# Patient Record
Sex: FEMALE | Employment: OTHER | URBAN - NONMETROPOLITAN AREA
[De-identification: names, ages, dates, MRNs, and addresses within clinical notes are randomized per-mention and may not be internally consistent; named-entity substitution may affect disease eponyms.]

---

## 2021-04-13 ENCOUNTER — OFFICE VISIT (OUTPATIENT)
Dept: PODIATRY | Age: 81
End: 2021-04-13
Payer: COMMERCIAL

## 2021-04-13 VITALS — RESPIRATION RATE: 20 BRPM

## 2021-04-13 DIAGNOSIS — I87.2 CHRONIC VENOUS INSUFFICIENCY: ICD-10-CM

## 2021-04-13 DIAGNOSIS — B35.1 DERMATOPHYTOSIS OF NAIL: ICD-10-CM

## 2021-04-13 DIAGNOSIS — M20.42 HAMMER TOES OF BOTH FEET: ICD-10-CM

## 2021-04-13 DIAGNOSIS — E11.51 CONTROLLED TYPE 2 DM WITH PERIPHERAL CIRCULATORY DISORDER (HCC): Primary | ICD-10-CM

## 2021-04-13 DIAGNOSIS — M20.41 HAMMER TOES OF BOTH FEET: ICD-10-CM

## 2021-04-13 PROCEDURE — 11720 DEBRIDE NAIL 1-5: CPT | Performed by: PODIATRIST

## 2021-04-13 PROCEDURE — 99203 OFFICE O/P NEW LOW 30 MIN: CPT | Performed by: PODIATRIST

## 2021-04-13 RX ORDER — ISOSORBIDE MONONITRATE 30 MG/1
30 TABLET, EXTENDED RELEASE ORAL 2 TIMES DAILY
COMMUNITY

## 2021-04-13 RX ORDER — GLIMEPIRIDE 4 MG/1
TABLET ORAL
COMMUNITY
Start: 2021-02-05

## 2021-04-13 RX ORDER — ALLOPURINOL 100 MG/1
TABLET ORAL
COMMUNITY
Start: 2021-03-24

## 2021-04-13 RX ORDER — EZETIMIBE 10 MG/1
10 TABLET ORAL DAILY
COMMUNITY
Start: 2020-05-06 | End: 2021-05-06

## 2021-04-13 RX ORDER — PRASUGREL 10 MG/1
5 TABLET, FILM COATED ORAL DAILY
COMMUNITY
Start: 2020-12-05

## 2021-04-13 RX ORDER — ASPIRIN 81 MG/1
81 TABLET ORAL DAILY
COMMUNITY

## 2021-04-13 RX ORDER — CALCIUM CARBONATE 300MG(750)
400 TABLET,CHEWABLE ORAL DAILY
COMMUNITY

## 2021-04-13 RX ORDER — VITAMIN E 268 MG
400 CAPSULE ORAL DAILY
COMMUNITY

## 2021-04-13 SDOH — HEALTH STABILITY: MENTAL HEALTH: HOW OFTEN DO YOU HAVE A DRINK CONTAINING ALCOHOL?: NEVER

## 2021-04-13 SDOH — HEALTH STABILITY: MENTAL HEALTH: HOW MANY STANDARD DRINKS CONTAINING ALCOHOL DO YOU HAVE ON A TYPICAL DAY?: NOT ASKED

## 2021-04-13 NOTE — PROGRESS NOTES
Subjective:  Valentino Rincon is a 78 y.o. female who presents to the office today complaining of swelling in leg.s symptoms began  year(s) ago. Patient relates pain is Absent . Pain is rated 0 out of 10 and is described as none. Treatments prior to today's visit include: none. Patient also request foot care. Currently denies F/C/N/V. Allergies   Allergen Reactions    Ticagrelor Shortness Of Breath    Indomethacin Other (See Comments)     flushing    Other      Other reaction(s): muscle cramps, Muscle Pain  - all of the statins      Pioglitazone Other (See Comments)     Can't remember reaction  Flushing feeling         Past Medical History:   Diagnosis Date    Arthritis     Diabetes ([facility] [address]     Hypertension        Prior to Admission medications    Medication Sig Start Date End Date Taking?  Authorizing Provider   Magnesium 400 MG TABS Take 400 mg by mouth daily   Yes Historical Provider, MD   Cyanocobalamin (B-12 SL) Place 1,000 tablets under the tongue daily   Yes Historical Provider, MD   Cholecalciferol 50 MCG (2000 UT) CAPS Take by mouth daily   Yes Historical Provider, MD   vitamin E 400 UNIT capsule Take 400 Units by mouth daily   Yes Historical Provider, MD   metFORMIN (GLUCOPHAGE) 500 MG tablet  3/5/21  Yes Historical Provider, MD   glimepiride (AMARYL) 4 MG tablet  2/5/21  Yes Historical Provider, MD   isosorbide mononitrate (IMDUR) 30 MG extended release tablet Take 30 mg by mouth 2 times daily   Yes Historical Provider, MD   prasugrel (EFFIENT) 10 MG TABS Take 5 mg by mouth daily 12/5/20  Yes Historical Provider, MD   allopurinol (ZYLOPRIM) 100 MG tablet  3/24/21  Yes Historical Provider, MD   ezetimibe (ZETIA) 10 MG tablet Take 10 mg by mouth daily 5/6/20 5/6/21 Yes Historical Provider, MD   metoprolol tartrate (LOPRESSOR) 25 MG tablet TAKE 1 TABLET TWICE A DAY 12/17/20 12/17/21 Yes Historical Provider, MD   aspirin 81 MG EC tablet Take 81 mg by mouth daily   Yes Historical Provider, MD Ascorbic Acid (VITAMIN C PO) Take by mouth   Yes Historical Provider, MD   CO ENZYME Q-10 PO Take by mouth   Yes Historical Provider, MD       Past Surgical History:   Procedure Laterality Date    COLONOSCOPY      FEMUR SURGERY Right     [facility]      TONSILLECTOMY AND ADENOIDECTOMY         History reviewed. No pertinent family history. Social History     Tobacco Use    Smoking status: Never Smoker    Smokeless tobacco: Never Used   Substance Use Topics    Alcohol use: Never     Frequency: Never     Binge frequency: Never       ROS: All 14 ROS systems reviewed and pertinent positives noted above, all others negative. Lower Extremity Physical Examination:     Vitals:   Vitals:    04/13/21 1334   Resp: 20     General: AAO x 3 in NAD. Vascular: DP and PT pulses palpable 2/4, bilateral.  CFT <3 seconds, bilateral.  Hair growth absent to the level of the digits, bilateral.  Edema present, bilateral.  Varicosities present, bilateral. Erythema absent, bilateral. Distal Rubor absent bilateral.  Temperature decreased bilateral. Hyperpigmentation present bilateral. Atrophic skin yes. Neurological: Sensation intact to light touch to level of digits, bilateral.  Protective sensation intact 10/10 sites via 5.07/10g [place] Monofilament, bilateral.  negative Tinel's, bilateral.  negative Valleix sign, bilateral.  Vibratory intact bilateral.  Reflexes Decreased bilateral.  Paresthesias negative. Dysthesias negative. Sharp/dull intact bilateral.    Musculoskeletal: Muscle strength 5/5, bilateral.  Pain absent upon palpation bilateral. Normal medial longitudinal arch, bilateral.  Ankle ROM decreased,bilateral.  1st MPJ ROM within normal limits, bilateral.  Dorsally contracted digits absent. Pronation. No other foot deformities.     Integument: Warm, dry, supple, bilateral.  Open lesion absent, bilateral.  Interdigital maceration absent to web spaces bilateral. Nails left 1, 5 and right 1, 5 thickened, dystrophic and crumbly, discolored with subungual debris. Fissures absent, bilateral. Hyperkeratotic tissue is absent. Asessment: Patient is a 78 y.o. female with:    Diagnosis Orders   1. Controlled type 2 DM with peripheral circulatory disorder ([address]     2. Dermatophytosis of nail     3. Hammer toes of both feet     4. Chronic venous insufficiency         Plan: Patient examined and evaluated. Current condition and treatment options discussed in detail. Patient should use compression stockings on a regular basis. Any increase in swelling or redness or signs of ulceration will be seen back for further compression wrap therapy. Also advised importance of continued elevation of the leg. DM foot ed and exam  All nails as mentioned above debrided in length and thickness. Patient advised OTC methods for treatment of the mycotic nails. Patient will follow up in 10 weeks. Patient low level medical decision making this visit. Patient is 1 acute on copy condition on stable chronic condition. No new testing ordered. Overall low risk of morbidity with current treatment course. Contact office with any questions/problems/concerns.

## 2021-04-13 NOTE — PROGRESS NOTES
Foot Care Worksheet  PCP: Ledon Plants  Last visit: 03 / 04 / 2021    Nail description:  Thick , Yellow , Crumbly , Marked limitation of ambulation     Pain resulting from thickened and dystrophy of nail plate No    Nails involved  Right   1, 5  (T5-T9)  Left     1, 5  (TA-T4)    Q7 1 Class A Finding - Non traumatic amputation of foot No    Q8 2 Class B Findings - Absent DP pulse No, Absent PT pulse No, Advanced tropic changes (3 required) Yes    Decrease hair growth Yes, Nail changes/thickening Yes, Pigmented changes/discoloration Yes, Skin texture (thin, shiny) Yes and No, Skin color (rubor/redness) No    Q9 1 Class B and 2 Class C Findings  Claudication No, Temperature change Yes, Paresthesia No, Burning No, Edema Yes

## 2021-08-16 ENCOUNTER — INITIAL CONSULT (OUTPATIENT)
Dept: SURGERY | Age: 81
End: 2021-08-16
Payer: COMMERCIAL

## 2021-08-16 VITALS
WEIGHT: 276 LBS | DIASTOLIC BLOOD PRESSURE: 72 MMHG | OXYGEN SATURATION: 93 % | SYSTOLIC BLOOD PRESSURE: 130 MMHG | TEMPERATURE: 97.6 F | HEART RATE: 82 BPM | BODY MASS INDEX: 50.79 KG/M2 | HEIGHT: 62 IN

## 2021-08-16 DIAGNOSIS — E66.01 MORBID OBESITY WITH BMI OF 50.0-59.9, ADULT (HCC): ICD-10-CM

## 2021-08-16 DIAGNOSIS — E11.42 DIABETIC POLYNEUROPATHY ASSOCIATED WITH TYPE 2 DIABETES MELLITUS (HCC): ICD-10-CM

## 2021-08-16 DIAGNOSIS — I73.9 ARTERIAL INSUFFICIENCY OF LOWER EXTREMITY (HCC): ICD-10-CM

## 2021-08-16 DIAGNOSIS — L81.9 DISCOLORATION OF SKIN OF TOE: Primary | ICD-10-CM

## 2021-08-16 PROCEDURE — 99202 OFFICE O/P NEW SF 15 MIN: CPT | Performed by: SURGERY

## 2021-08-16 RX ORDER — NITROGLYCERIN 0.4 MG/1
1 TABLET SUBLINGUAL EVERY 5 MIN PRN
COMMUNITY

## 2021-08-16 RX ORDER — LIRAGLUTIDE 6 MG/ML
INJECTION SUBCUTANEOUS
COMMUNITY
Start: 2021-06-07

## 2021-08-16 RX ORDER — FUROSEMIDE 20 MG/1
10 TABLET ORAL DAILY
COMMUNITY

## 2021-08-16 RX ORDER — ACETAMINOPHEN,DIPHENHYDRAMINE HCL 500; 25 MG/1; MG/1
1 TABLET, FILM COATED ORAL NIGHTLY PRN
COMMUNITY

## 2025-04-03 ENCOUNTER — HOSPITAL ENCOUNTER (INPATIENT)
Age: 85
LOS: 5 days | Discharge: HOME | DRG: 330 | End: 2025-04-08
Attending: INTERNAL MEDICINE | Admitting: INTERNAL MEDICINE
Payer: MEDICARE

## 2025-04-03 ENCOUNTER — ANESTHESIA (OUTPATIENT)
Dept: OPERATING ROOM | Age: 85
End: 2025-04-03
Payer: MEDICARE

## 2025-04-03 ENCOUNTER — ANESTHESIA EVENT (OUTPATIENT)
Dept: OPERATING ROOM | Age: 85
End: 2025-04-03
Payer: MEDICARE

## 2025-04-03 DIAGNOSIS — K56.609 SMALL BOWEL OBSTRUCTION (HCC): Primary | ICD-10-CM

## 2025-04-03 PROBLEM — Z95.5 S/P CORONARY ARTERY STENT PLACEMENT: Status: RESOLVED | Noted: 2022-09-29 | Resolved: 2025-04-03

## 2025-04-03 PROBLEM — H43.813 POSTERIOR VITREOUS DETACHMENT OF BOTH EYES: Status: RESOLVED | Noted: 2021-12-13 | Resolved: 2025-04-03

## 2025-04-03 PROBLEM — H40.053 OCULAR HYPERTENSION, BILATERAL: Status: RESOLVED | Noted: 2022-12-12 | Resolved: 2025-04-03

## 2025-04-03 PROBLEM — Z92.89 HISTORY OF CARDIOVERSION: Status: RESOLVED | Noted: 2024-05-10 | Resolved: 2025-04-03

## 2025-04-03 PROBLEM — S82.91XD CLOSED FRACTURE OF RIGHT LOWER EXTREMITY WITH ROUTINE HEALING: Status: RESOLVED | Noted: 2017-05-16 | Resolved: 2025-04-03

## 2025-04-03 PROBLEM — E11.3291 MILD NONPROLIFERATIVE DIABETIC RETINOPATHY OF RIGHT EYE WITHOUT MACULAR EDEMA ASSOCIATED WITH TYPE 2 DIABETES MELLITUS: Status: ACTIVE | Noted: 2021-12-13

## 2025-04-03 PROBLEM — Z80.8 FAMILY HISTORY OF MELANOMA: Status: RESOLVED | Noted: 2017-05-31 | Resolved: 2025-04-03

## 2025-04-03 PROBLEM — H04.129 DRY EYE: Status: RESOLVED | Noted: 2021-12-13 | Resolved: 2025-04-03

## 2025-04-03 PROBLEM — T46.6X5A MYALGIA DUE TO STATIN: Status: ACTIVE | Noted: 2018-12-02

## 2025-04-03 PROBLEM — E11.42 DIABETIC POLYNEUROPATHY ASSOCIATED WITH TYPE 2 DIABETES MELLITUS: Status: ACTIVE | Noted: 2017-05-31

## 2025-04-03 PROBLEM — R07.9 CHEST PAIN: Status: RESOLVED | Noted: 2022-09-28 | Resolved: 2025-04-03

## 2025-04-03 PROBLEM — M79.10 MYALGIA DUE TO STATIN: Status: ACTIVE | Noted: 2018-12-02

## 2025-04-03 PROBLEM — H40.003 GLAUCOMA SUSPECT OF BOTH EYES: Status: RESOLVED | Noted: 2019-05-13 | Resolved: 2025-04-03

## 2025-04-03 PROBLEM — H91.93 BILATERAL HEARING LOSS: Status: ACTIVE | Noted: 2021-08-30

## 2025-04-03 PROBLEM — I50.32 CHRONIC HEART FAILURE WITH PRESERVED EJECTION FRACTION (HCC): Status: RESOLVED | Noted: 2024-05-13 | Resolved: 2025-04-03

## 2025-04-03 PROBLEM — S09.90XA INJURY OF HEAD: Status: RESOLVED | Noted: 2024-08-29 | Resolved: 2025-04-03

## 2025-04-03 PROBLEM — I50.1 HEART FAILURE, LEFT, WITH LVEF >40% (HCC): Status: ACTIVE | Noted: 2022-09-29

## 2025-04-03 PROBLEM — N18.31 CKD STAGE 3A, GFR 45-59 ML/MIN (HCC): Status: RESOLVED | Noted: 2024-05-13 | Resolved: 2025-04-03

## 2025-04-03 PROBLEM — H02.055 TRICHIASIS OF LEFT LOWER EYELID: Status: RESOLVED | Noted: 2022-12-12 | Resolved: 2025-04-03

## 2025-04-03 PROBLEM — I89.0 LYMPHEDEMA OF BOTH LOWER EXTREMITIES: Status: ACTIVE | Noted: 2021-01-20

## 2025-04-03 PROBLEM — R26.9 GAIT DIFFICULTY: Status: RESOLVED | Noted: 2017-05-16 | Resolved: 2025-04-03

## 2025-04-03 PROBLEM — Z98.890 H/O COLONOSCOPY: Status: RESOLVED | Noted: 2017-05-31 | Resolved: 2025-04-03

## 2025-04-03 PROBLEM — L21.9 SEBORRHEIC DERMATITIS: Status: RESOLVED | Noted: 2017-08-23 | Resolved: 2025-04-03

## 2025-04-03 PROBLEM — D50.9 IDA (IRON DEFICIENCY ANEMIA): Status: ACTIVE | Noted: 2024-05-13

## 2025-04-03 PROBLEM — I48.3 TYPICAL ATRIAL FLUTTER (HCC): Status: ACTIVE | Noted: 2024-05-04

## 2025-04-03 PROBLEM — H35.3131 EARLY DRY STAGE NONEXUDATIVE AGE-RELATED MACULAR DEGENERATION OF BOTH EYES: Status: RESOLVED | Noted: 2019-05-13 | Resolved: 2025-04-03

## 2025-04-03 LAB
ABO + RH BLD: NORMAL
ANION GAP SERPL CALCULATED.3IONS-SCNC: 10 MMOL/L (ref 9–16)
ARM BAND NUMBER: NORMAL
BLOOD BANK SAMPLE EXPIRATION: NORMAL
BLOOD GROUP ANTIBODIES SERPL: NEGATIVE
BUN SERPL-MCNC: 30 MG/DL (ref 8–23)
BUN/CREAT SERPL: 33 (ref 9–20)
CALCIUM SERPL-MCNC: 10.7 MG/DL (ref 8.6–10.4)
CHLORIDE SERPL-SCNC: 107 MMOL/L (ref 98–107)
CO2 SERPL-SCNC: 23 MMOL/L (ref 20–31)
CREAT SERPL-MCNC: 0.9 MG/DL (ref 0.6–0.9)
ERYTHROCYTE [DISTWIDTH] IN BLOOD BY AUTOMATED COUNT: 14.6 % (ref 11.8–14.4)
GFR, ESTIMATED: 62 ML/MIN/1.73M2
GLUCOSE BLD-MCNC: 227 MG/DL (ref 65–105)
GLUCOSE SERPL-MCNC: 184 MG/DL (ref 74–99)
HCT VFR BLD AUTO: 41.2 % (ref 36.3–47.1)
HGB BLD-MCNC: 13.4 G/DL (ref 11.9–15.1)
MCH RBC QN AUTO: 29.8 PG (ref 25.2–33.5)
MCHC RBC AUTO-ENTMCNC: 32.5 G/DL (ref 25.2–33.5)
MCV RBC AUTO: 91.8 FL (ref 82.6–102.9)
NRBC BLD-RTO: 0 PER 100 WBC
PLATELET # BLD AUTO: 233 K/UL (ref 138–453)
PMV BLD AUTO: 10.4 FL (ref 8.1–13.5)
POTASSIUM SERPL-SCNC: 4.1 MMOL/L (ref 3.7–5.3)
RBC # BLD AUTO: 4.49 M/UL (ref 3.95–5.11)
SODIUM SERPL-SCNC: 140 MMOL/L (ref 136–145)
WBC OTHER # BLD: 11.5 K/UL (ref 3.5–11.3)

## 2025-04-03 PROCEDURE — 3700000000 HC ANESTHESIA ATTENDED CARE: Performed by: SURGERY

## 2025-04-03 PROCEDURE — 7100000001 HC PACU RECOVERY - ADDTL 15 MIN: Performed by: SURGERY

## 2025-04-03 PROCEDURE — 82947 ASSAY GLUCOSE BLOOD QUANT: CPT

## 2025-04-03 PROCEDURE — 3700000001 HC ADD 15 MINUTES (ANESTHESIA): Performed by: SURGERY

## 2025-04-03 PROCEDURE — 2709999900 HC NON-CHARGEABLE SUPPLY: Performed by: SURGERY

## 2025-04-03 PROCEDURE — 36415 COLL VENOUS BLD VENIPUNCTURE: CPT

## 2025-04-03 PROCEDURE — 85027 COMPLETE CBC AUTOMATED: CPT

## 2025-04-03 PROCEDURE — 86900 BLOOD TYPING SEROLOGIC ABO: CPT

## 2025-04-03 PROCEDURE — 3600000019 HC SURGERY ROBOT ADDTL 15MIN: Performed by: SURGERY

## 2025-04-03 PROCEDURE — S2900 ROBOTIC SURGICAL SYSTEM: HCPCS | Performed by: SURGERY

## 2025-04-03 PROCEDURE — 2580000003 HC RX 258: Performed by: SURGERY

## 2025-04-03 PROCEDURE — 0DQ80ZZ REPAIR SMALL INTESTINE, OPEN APPROACH: ICD-10-PCS | Performed by: SURGERY

## 2025-04-03 PROCEDURE — 2060000000 HC ICU INTERMEDIATE R&B

## 2025-04-03 PROCEDURE — 2720000010 HC SURG SUPPLY STERILE: Performed by: SURGERY

## 2025-04-03 PROCEDURE — 6360000002 HC RX W HCPCS: Performed by: SURGERY

## 2025-04-03 PROCEDURE — 8E0W4CZ ROBOTIC ASSISTED PROCEDURE OF TRUNK REGION, PERCUTANEOUS ENDOSCOPIC APPROACH: ICD-10-PCS | Performed by: SURGERY

## 2025-04-03 PROCEDURE — 86901 BLOOD TYPING SEROLOGIC RH(D): CPT

## 2025-04-03 PROCEDURE — 64488 TAP BLOCK BI INJECTION: CPT

## 2025-04-03 PROCEDURE — P9047 ALBUMIN (HUMAN), 25%, 50ML: HCPCS

## 2025-04-03 PROCEDURE — 4A1BXSH MONITORING OF GASTROINTESTINAL VASCULAR PERFUSION USING INDOCYANINE GREEN DYE, EXTERNAL APPROACH: ICD-10-PCS | Performed by: SURGERY

## 2025-04-03 PROCEDURE — 0DN80ZZ RELEASE SMALL INTESTINE, OPEN APPROACH: ICD-10-PCS | Performed by: SURGERY

## 2025-04-03 PROCEDURE — 7100000000 HC PACU RECOVERY - FIRST 15 MIN: Performed by: SURGERY

## 2025-04-03 PROCEDURE — 94640 AIRWAY INHALATION TREATMENT: CPT

## 2025-04-03 PROCEDURE — 86850 RBC ANTIBODY SCREEN: CPT

## 2025-04-03 PROCEDURE — 0DNL0ZZ RELEASE TRANSVERSE COLON, OPEN APPROACH: ICD-10-PCS | Performed by: SURGERY

## 2025-04-03 PROCEDURE — 99222 1ST HOSP IP/OBS MODERATE 55: CPT | Performed by: INTERNAL MEDICINE

## 2025-04-03 PROCEDURE — 6360000002 HC RX W HCPCS

## 2025-04-03 PROCEDURE — 0DNU0ZZ RELEASE OMENTUM, OPEN APPROACH: ICD-10-PCS | Performed by: SURGERY

## 2025-04-03 PROCEDURE — 3E0T3BZ INTRODUCTION OF ANESTHETIC AGENT INTO PERIPHERAL NERVES AND PLEXI, PERCUTANEOUS APPROACH: ICD-10-PCS | Performed by: SURGERY

## 2025-04-03 PROCEDURE — 2580000003 HC RX 258

## 2025-04-03 PROCEDURE — 44005 FREEING OF BOWEL ADHESION: CPT | Performed by: SURGERY

## 2025-04-03 PROCEDURE — 2500000003 HC RX 250 WO HCPCS

## 2025-04-03 PROCEDURE — 80048 BASIC METABOLIC PNL TOTAL CA: CPT

## 2025-04-03 PROCEDURE — 6370000000 HC RX 637 (ALT 250 FOR IP): Performed by: INTERNAL MEDICINE

## 2025-04-03 PROCEDURE — 93005 ELECTROCARDIOGRAM TRACING: CPT | Performed by: INTERNAL MEDICINE

## 2025-04-03 PROCEDURE — 99223 1ST HOSP IP/OBS HIGH 75: CPT | Performed by: SURGERY

## 2025-04-03 PROCEDURE — 3600000009 HC SURGERY ROBOT BASE: Performed by: SURGERY

## 2025-04-03 RX ORDER — SODIUM CHLORIDE 0.9 % (FLUSH) 0.9 %
5-40 SYRINGE (ML) INJECTION PRN
Status: DISCONTINUED | OUTPATIENT
Start: 2025-04-03 | End: 2025-04-08 | Stop reason: HOSPADM

## 2025-04-03 RX ORDER — SODIUM CHLORIDE 9 MG/ML
INJECTION, SOLUTION INTRAVENOUS PRN
Status: DISCONTINUED | OUTPATIENT
Start: 2025-04-03 | End: 2025-04-03 | Stop reason: HOSPADM

## 2025-04-03 RX ORDER — SODIUM CHLORIDE, SODIUM LACTATE, POTASSIUM CHLORIDE, CALCIUM CHLORIDE 600; 310; 30; 20 MG/100ML; MG/100ML; MG/100ML; MG/100ML
INJECTION, SOLUTION INTRAVENOUS CONTINUOUS
Status: DISCONTINUED | OUTPATIENT
Start: 2025-04-03 | End: 2025-04-03 | Stop reason: HOSPADM

## 2025-04-03 RX ORDER — GLUCAGON 1 MG/ML
1 KIT INJECTION PRN
Status: DISCONTINUED | OUTPATIENT
Start: 2025-04-03 | End: 2025-04-03 | Stop reason: HOSPADM

## 2025-04-03 RX ORDER — ROCURONIUM BROMIDE 10 MG/ML
INJECTION, SOLUTION INTRAVENOUS
Status: DISCONTINUED | OUTPATIENT
Start: 2025-04-03 | End: 2025-04-03 | Stop reason: SDUPTHER

## 2025-04-03 RX ORDER — METOPROLOL TARTRATE 25 MG/1
25 TABLET, FILM COATED ORAL 2 TIMES DAILY
Status: DISCONTINUED | OUTPATIENT
Start: 2025-04-04 | End: 2025-04-08 | Stop reason: HOSPADM

## 2025-04-03 RX ORDER — NYSTATIN 100000 [USP'U]/G
1 POWDER TOPICAL 3 TIMES DAILY PRN
COMMUNITY

## 2025-04-03 RX ORDER — ACETAMINOPHEN 325 MG/1
650 TABLET ORAL EVERY 4 HOURS PRN
Status: DISCONTINUED | OUTPATIENT
Start: 2025-04-03 | End: 2025-04-05

## 2025-04-03 RX ORDER — SODIUM CHLORIDE 9 MG/ML
INJECTION, SOLUTION INTRAVENOUS PRN
Status: DISCONTINUED | OUTPATIENT
Start: 2025-04-03 | End: 2025-04-08 | Stop reason: HOSPADM

## 2025-04-03 RX ORDER — ONDANSETRON 2 MG/ML
INJECTION INTRAMUSCULAR; INTRAVENOUS
Status: DISCONTINUED | OUTPATIENT
Start: 2025-04-03 | End: 2025-04-03 | Stop reason: SDUPTHER

## 2025-04-03 RX ORDER — ETOMIDATE 2 MG/ML
INJECTION INTRAVENOUS
Status: DISCONTINUED | OUTPATIENT
Start: 2025-04-03 | End: 2025-04-03 | Stop reason: SDUPTHER

## 2025-04-03 RX ORDER — PROPOFOL 10 MG/ML
INJECTION, EMULSION INTRAVENOUS
Status: DISCONTINUED | OUTPATIENT
Start: 2025-04-03 | End: 2025-04-03 | Stop reason: SDUPTHER

## 2025-04-03 RX ORDER — FENTANYL CITRATE 50 UG/ML
INJECTION, SOLUTION INTRAMUSCULAR; INTRAVENOUS
Status: DISCONTINUED | OUTPATIENT
Start: 2025-04-03 | End: 2025-04-03 | Stop reason: SDUPTHER

## 2025-04-03 RX ORDER — CALCIUM CHLORIDE 100 MG/ML
INJECTION INTRAVENOUS; INTRAVENTRICULAR
Status: DISCONTINUED | OUTPATIENT
Start: 2025-04-03 | End: 2025-04-03 | Stop reason: SDUPTHER

## 2025-04-03 RX ORDER — SODIUM CHLORIDE 0.9 % (FLUSH) 0.9 %
10 SYRINGE (ML) INJECTION PRN
Status: DISCONTINUED | OUTPATIENT
Start: 2025-04-03 | End: 2025-04-08 | Stop reason: HOSPADM

## 2025-04-03 RX ORDER — INSULIN LISPRO 100 [IU]/ML
0-4 INJECTION, SOLUTION INTRAVENOUS; SUBCUTANEOUS EVERY 6 HOURS SCHEDULED
Status: DISCONTINUED | OUTPATIENT
Start: 2025-04-04 | End: 2025-04-04

## 2025-04-03 RX ORDER — VASOPRESSIN 20 [USP'U]/ML
INJECTION, SOLUTION INTRAMUSCULAR; SUBCUTANEOUS
Status: DISCONTINUED | OUTPATIENT
Start: 2025-04-03 | End: 2025-04-03 | Stop reason: SDUPTHER

## 2025-04-03 RX ORDER — SODIUM CHLORIDE 0.9 % (FLUSH) 0.9 %
10 SYRINGE (ML) INJECTION EVERY 12 HOURS SCHEDULED
Status: DISCONTINUED | OUTPATIENT
Start: 2025-04-03 | End: 2025-04-08 | Stop reason: HOSPADM

## 2025-04-03 RX ORDER — KETAMINE HYDROCHLORIDE 50 MG/ML
INJECTION, SOLUTION INTRAMUSCULAR; INTRAVENOUS
Status: DISCONTINUED | OUTPATIENT
Start: 2025-04-03 | End: 2025-04-03 | Stop reason: SDUPTHER

## 2025-04-03 RX ORDER — SODIUM CHLORIDE 0.9 % (FLUSH) 0.9 %
5-40 SYRINGE (ML) INJECTION EVERY 12 HOURS SCHEDULED
Status: DISCONTINUED | OUTPATIENT
Start: 2025-04-03 | End: 2025-04-03 | Stop reason: HOSPADM

## 2025-04-03 RX ORDER — GLUCAGON 1 MG/ML
1 KIT INJECTION PRN
Status: DISCONTINUED | OUTPATIENT
Start: 2025-04-03 | End: 2025-04-08 | Stop reason: HOSPADM

## 2025-04-03 RX ORDER — ONDANSETRON 2 MG/ML
4 INJECTION INTRAMUSCULAR; INTRAVENOUS EVERY 6 HOURS PRN
Status: DISCONTINUED | OUTPATIENT
Start: 2025-04-03 | End: 2025-04-08 | Stop reason: HOSPADM

## 2025-04-03 RX ORDER — IPRATROPIUM BROMIDE AND ALBUTEROL SULFATE 2.5; .5 MG/3ML; MG/3ML
1 SOLUTION RESPIRATORY (INHALATION)
Status: DISCONTINUED | OUTPATIENT
Start: 2025-04-03 | End: 2025-04-08 | Stop reason: HOSPADM

## 2025-04-03 RX ORDER — ISOSORBIDE MONONITRATE 60 MG/1
60 TABLET, EXTENDED RELEASE ORAL DAILY
Status: DISCONTINUED | OUTPATIENT
Start: 2025-04-04 | End: 2025-04-08 | Stop reason: HOSPADM

## 2025-04-03 RX ORDER — SODIUM CHLORIDE 0.9 % (FLUSH) 0.9 %
5-40 SYRINGE (ML) INJECTION PRN
Status: DISCONTINUED | OUTPATIENT
Start: 2025-04-03 | End: 2025-04-03 | Stop reason: HOSPADM

## 2025-04-03 RX ORDER — SODIUM CHLORIDE 9 MG/ML
INJECTION, SOLUTION INTRAVENOUS
Status: DISCONTINUED | OUTPATIENT
Start: 2025-04-03 | End: 2025-04-03 | Stop reason: SDUPTHER

## 2025-04-03 RX ORDER — SODIUM CHLORIDE 0.9 % (FLUSH) 0.9 %
5-40 SYRINGE (ML) INJECTION EVERY 12 HOURS SCHEDULED
Status: DISCONTINUED | OUTPATIENT
Start: 2025-04-03 | End: 2025-04-08 | Stop reason: HOSPADM

## 2025-04-03 RX ORDER — SODIUM CHLORIDE 9 MG/ML
50 INJECTION, SOLUTION INTRAVENOUS ONCE
Status: DISCONTINUED | OUTPATIENT
Start: 2025-04-03 | End: 2025-04-03 | Stop reason: SDUPTHER

## 2025-04-03 RX ORDER — NALOXONE HYDROCHLORIDE 0.4 MG/ML
INJECTION, SOLUTION INTRAMUSCULAR; INTRAVENOUS; SUBCUTANEOUS PRN
Status: DISCONTINUED | OUTPATIENT
Start: 2025-04-03 | End: 2025-04-03 | Stop reason: HOSPADM

## 2025-04-03 RX ORDER — IPRATROPIUM BROMIDE AND ALBUTEROL SULFATE 2.5; .5 MG/3ML; MG/3ML
1 SOLUTION RESPIRATORY (INHALATION) EVERY 4 HOURS PRN
Status: DISCONTINUED | OUTPATIENT
Start: 2025-04-03 | End: 2025-04-08 | Stop reason: HOSPADM

## 2025-04-03 RX ORDER — ONDANSETRON 2 MG/ML
4 INJECTION INTRAMUSCULAR; INTRAVENOUS
Status: DISCONTINUED | OUTPATIENT
Start: 2025-04-03 | End: 2025-04-03 | Stop reason: HOSPADM

## 2025-04-03 RX ORDER — SODIUM CHLORIDE 9 MG/ML
50 INJECTION, SOLUTION INTRAVENOUS ONCE
Status: DISCONTINUED | OUTPATIENT
Start: 2025-04-03 | End: 2025-04-03

## 2025-04-03 RX ORDER — SPIRONOLACTONE 25 MG/1
25 TABLET ORAL DAILY
Status: ON HOLD | COMMUNITY
Start: 2024-05-13 | End: 2025-04-08 | Stop reason: HOSPADM

## 2025-04-03 RX ORDER — LABETALOL HYDROCHLORIDE 5 MG/ML
5 INJECTION, SOLUTION INTRAVENOUS
Status: DISCONTINUED | OUTPATIENT
Start: 2025-04-03 | End: 2025-04-03 | Stop reason: HOSPADM

## 2025-04-03 RX ORDER — POLYETHYLENE GLYCOL 3350 17 G/17G
17 POWDER, FOR SOLUTION ORAL DAILY PRN
Status: DISCONTINUED | OUTPATIENT
Start: 2025-04-03 | End: 2025-04-08 | Stop reason: HOSPADM

## 2025-04-03 RX ORDER — DEXMEDETOMIDINE HYDROCHLORIDE 100 UG/ML
INJECTION, SOLUTION INTRAVENOUS
Status: DISCONTINUED | OUTPATIENT
Start: 2025-04-03 | End: 2025-04-03 | Stop reason: SDUPTHER

## 2025-04-03 RX ORDER — MORPHINE SULFATE 2 MG/ML
2 INJECTION, SOLUTION INTRAMUSCULAR; INTRAVENOUS
Status: DISCONTINUED | OUTPATIENT
Start: 2025-04-03 | End: 2025-04-08 | Stop reason: HOSPADM

## 2025-04-03 RX ORDER — BUPIVACAINE HYDROCHLORIDE 2.5 MG/ML
INJECTION, SOLUTION INFILTRATION; PERINEURAL
Status: COMPLETED | OUTPATIENT
Start: 2025-04-03 | End: 2025-04-03

## 2025-04-03 RX ORDER — DEXTROSE MONOHYDRATE 100 MG/ML
INJECTION, SOLUTION INTRAVENOUS CONTINUOUS PRN
Status: DISCONTINUED | OUTPATIENT
Start: 2025-04-03 | End: 2025-04-03 | Stop reason: HOSPADM

## 2025-04-03 RX ORDER — COLCHICINE 0.6 MG/1
0.6 TABLET ORAL 2 TIMES DAILY
COMMUNITY

## 2025-04-03 RX ORDER — BUPIVACAINE HYDROCHLORIDE 2.5 MG/ML
INJECTION, SOLUTION EPIDURAL; INFILTRATION; INTRACAUDAL; PERINEURAL
Status: COMPLETED | OUTPATIENT
Start: 2025-04-03 | End: 2025-04-03

## 2025-04-03 RX ORDER — ACETAMINOPHEN 650 MG/1
650 SUPPOSITORY RECTAL EVERY 6 HOURS PRN
Status: DISCONTINUED | OUTPATIENT
Start: 2025-04-03 | End: 2025-04-03 | Stop reason: SDUPTHER

## 2025-04-03 RX ORDER — ALBUMIN (HUMAN) 12.5 G/50ML
SOLUTION INTRAVENOUS
Status: DISCONTINUED | OUTPATIENT
Start: 2025-04-03 | End: 2025-04-03 | Stop reason: SDUPTHER

## 2025-04-03 RX ORDER — LIDOCAINE HYDROCHLORIDE 10 MG/ML
INJECTION, SOLUTION EPIDURAL; INFILTRATION; INTRACAUDAL; PERINEURAL
Status: DISCONTINUED | OUTPATIENT
Start: 2025-04-03 | End: 2025-04-03 | Stop reason: SDUPTHER

## 2025-04-03 RX ORDER — ACETAMINOPHEN 325 MG/1
650 TABLET ORAL EVERY 6 HOURS PRN
Status: DISCONTINUED | OUTPATIENT
Start: 2025-04-03 | End: 2025-04-03 | Stop reason: SDUPTHER

## 2025-04-03 RX ORDER — TIRZEPATIDE 5 MG/.5ML
5 INJECTION, SOLUTION SUBCUTANEOUS WEEKLY
Status: ON HOLD | COMMUNITY
Start: 2024-06-25 | End: 2025-04-08 | Stop reason: HOSPADM

## 2025-04-03 RX ORDER — INDOCYANINE GREEN AND WATER 25 MG
KIT INJECTION
Status: DISCONTINUED | OUTPATIENT
Start: 2025-04-03 | End: 2025-04-03 | Stop reason: SDUPTHER

## 2025-04-03 RX ORDER — DEXTROSE MONOHYDRATE 100 MG/ML
INJECTION, SOLUTION INTRAVENOUS CONTINUOUS PRN
Status: DISCONTINUED | OUTPATIENT
Start: 2025-04-03 | End: 2025-04-03 | Stop reason: SDUPTHER

## 2025-04-03 RX ORDER — MORPHINE SULFATE 4 MG/ML
4 INJECTION, SOLUTION INTRAMUSCULAR; INTRAVENOUS
Status: DISCONTINUED | OUTPATIENT
Start: 2025-04-03 | End: 2025-04-08 | Stop reason: HOSPADM

## 2025-04-03 RX ORDER — SODIUM CHLORIDE 9 MG/ML
INJECTION, SOLUTION INTRAVENOUS CONTINUOUS
Status: DISCONTINUED | OUTPATIENT
Start: 2025-04-03 | End: 2025-04-08

## 2025-04-03 RX ORDER — DEXTROSE MONOHYDRATE 100 MG/ML
INJECTION, SOLUTION INTRAVENOUS CONTINUOUS PRN
Status: DISCONTINUED | OUTPATIENT
Start: 2025-04-03 | End: 2025-04-08 | Stop reason: HOSPADM

## 2025-04-03 RX ORDER — DEXAMETHASONE SODIUM PHOSPHATE 4 MG/ML
INJECTION, SOLUTION INTRA-ARTICULAR; INTRALESIONAL; INTRAMUSCULAR; INTRAVENOUS; SOFT TISSUE
Status: DISCONTINUED | OUTPATIENT
Start: 2025-04-03 | End: 2025-04-03 | Stop reason: SDUPTHER

## 2025-04-03 RX ORDER — INSULIN GLARGINE 100 [IU]/ML
10 INJECTION, SOLUTION SUBCUTANEOUS DAILY
Status: DISCONTINUED | OUTPATIENT
Start: 2025-04-04 | End: 2025-04-04

## 2025-04-03 RX ORDER — MAGNESIUM SULFATE HEPTAHYDRATE 500 MG/ML
INJECTION, SOLUTION INTRAMUSCULAR; INTRAVENOUS
Status: DISCONTINUED | OUTPATIENT
Start: 2025-04-03 | End: 2025-04-03 | Stop reason: SDUPTHER

## 2025-04-03 RX ADMIN — SODIUM CHLORIDE, POTASSIUM CHLORIDE, SODIUM LACTATE AND CALCIUM CHLORIDE: 600; 310; 30; 20 INJECTION, SOLUTION INTRAVENOUS at 15:59

## 2025-04-03 RX ADMIN — VASOPRESSIN 1 UNITS: 20 INJECTION INTRAVENOUS at 17:03

## 2025-04-03 RX ADMIN — Medication 2000 MG: at 16:19

## 2025-04-03 RX ADMIN — ETOMIDATE 20 MG: 2 INJECTION, SOLUTION INTRAVENOUS at 16:06

## 2025-04-03 RX ADMIN — FENTANYL CITRATE 50 MCG: 50 INJECTION, SOLUTION INTRAMUSCULAR; INTRAVENOUS at 16:06

## 2025-04-03 RX ADMIN — CALCIUM CHLORIDE 0.2 G: 100 INJECTION, SOLUTION INTRAVENOUS at 17:01

## 2025-04-03 RX ADMIN — CALCIUM CHLORIDE 0.2 G: 100 INJECTION, SOLUTION INTRAVENOUS at 17:06

## 2025-04-03 RX ADMIN — SODIUM CHLORIDE: 0.9 INJECTION, SOLUTION INTRAVENOUS at 21:16

## 2025-04-03 RX ADMIN — PROPOFOL 20 MG: 10 INJECTION, EMULSION INTRAVENOUS at 16:11

## 2025-04-03 RX ADMIN — PROTHROMBIN COMPLEX CONCENTRATE (HUMAN) 2000 UNITS: 25.5; 16.5; 24; 22; 22; 26 POWDER, FOR SOLUTION INTRAVENOUS at 15:58

## 2025-04-03 RX ADMIN — CALCIUM CHLORIDE 0.2 G: 100 INJECTION, SOLUTION INTRAVENOUS at 16:51

## 2025-04-03 RX ADMIN — SUGAMMADEX 100 MG: 100 INJECTION, SOLUTION INTRAVENOUS at 19:26

## 2025-04-03 RX ADMIN — BUPIVACAINE HYDROCHLORIDE 20 ML: 2.5 INJECTION, SOLUTION INFILTRATION; PERINEURAL at 19:07

## 2025-04-03 RX ADMIN — DEXAMETHASONE SODIUM PHOSPHATE 4 MG: 4 INJECTION INTRA-ARTICULAR; INTRALESIONAL; INTRAMUSCULAR; INTRAVENOUS; SOFT TISSUE at 16:33

## 2025-04-03 RX ADMIN — PHENYLEPHRINE HYDROCHLORIDE 100 MCG: 10 INJECTION INTRAVENOUS at 16:17

## 2025-04-03 RX ADMIN — IPRATROPIUM BROMIDE AND ALBUTEROL SULFATE 1 DOSE: .5; 2.5 SOLUTION RESPIRATORY (INHALATION) at 15:31

## 2025-04-03 RX ADMIN — PROPOFOL 120 MCG/KG/MIN: 10 INJECTION, EMULSION INTRAVENOUS at 16:10

## 2025-04-03 RX ADMIN — SODIUM CHLORIDE: 9 INJECTION, SOLUTION INTRAVENOUS at 16:20

## 2025-04-03 RX ADMIN — Medication 30 MG: at 16:06

## 2025-04-03 RX ADMIN — DEXMEDETOMIDINE HYDROCHLORIDE 5 MCG: 100 INJECTION, SOLUTION INTRAVENOUS at 17:16

## 2025-04-03 RX ADMIN — DEXMEDETOMIDINE HYDROCHLORIDE 5 MCG: 100 INJECTION, SOLUTION INTRAVENOUS at 17:05

## 2025-04-03 RX ADMIN — PHENYLEPHRINE HYDROCHLORIDE 100 MCG: 10 INJECTION INTRAVENOUS at 16:38

## 2025-04-03 RX ADMIN — PHENYLEPHRINE HYDROCHLORIDE 100 MCG: 10 INJECTION INTRAVENOUS at 17:44

## 2025-04-03 RX ADMIN — SODIUM CHLORIDE, PRESERVATIVE FREE 10 ML: 5 INJECTION INTRAVENOUS at 21:17

## 2025-04-03 RX ADMIN — CALCIUM CHLORIDE 0.2 G: 100 INJECTION, SOLUTION INTRAVENOUS at 16:55

## 2025-04-03 RX ADMIN — CALCIUM CHLORIDE 0.2 G: 100 INJECTION, SOLUTION INTRAVENOUS at 16:57

## 2025-04-03 RX ADMIN — HYDROMORPHONE HYDROCHLORIDE 0.25 MG: 1 INJECTION, SOLUTION INTRAMUSCULAR; INTRAVENOUS; SUBCUTANEOUS at 18:52

## 2025-04-03 RX ADMIN — Medication 20 MG: at 16:24

## 2025-04-03 RX ADMIN — BUPIVACAINE HYDROCHLORIDE 20 ML: 2.5 INJECTION, SOLUTION EPIDURAL; INFILTRATION; INTRACAUDAL; PERINEURAL at 19:07

## 2025-04-03 RX ADMIN — ROCURONIUM BROMIDE 20 MG: 10 INJECTION INTRAVENOUS at 17:29

## 2025-04-03 RX ADMIN — DEXMEDETOMIDINE HYDROCHLORIDE 5 MCG: 100 INJECTION, SOLUTION INTRAVENOUS at 16:15

## 2025-04-03 RX ADMIN — ONDANSETRON 4 MG: 2 INJECTION, SOLUTION INTRAMUSCULAR; INTRAVENOUS at 18:51

## 2025-04-03 RX ADMIN — ROCURONIUM BROMIDE 20 MG: 10 INJECTION INTRAVENOUS at 18:29

## 2025-04-03 RX ADMIN — INDOCYANINE GREEN AND WATER 2.5 MG: KIT at 18:22

## 2025-04-03 RX ADMIN — Medication 2000 MG: at 21:35

## 2025-04-03 RX ADMIN — PHENYLEPHRINE HYDROCHLORIDE 100 MCG: 10 INJECTION INTRAVENOUS at 18:47

## 2025-04-03 RX ADMIN — HYDROMORPHONE HYDROCHLORIDE 0.25 MG: 1 INJECTION, SOLUTION INTRAMUSCULAR; INTRAVENOUS; SUBCUTANEOUS at 19:15

## 2025-04-03 RX ADMIN — VASOPRESSIN 0.5 UNITS: 20 INJECTION INTRAVENOUS at 17:57

## 2025-04-03 RX ADMIN — PHENYLEPHRINE HYDROCHLORIDE 100 MCG: 10 INJECTION INTRAVENOUS at 18:06

## 2025-04-03 RX ADMIN — FENTANYL CITRATE 50 MCG: 50 INJECTION, SOLUTION INTRAMUSCULAR; INTRAVENOUS at 17:32

## 2025-04-03 RX ADMIN — LIDOCAINE HYDROCHLORIDE 50 MG: 10 INJECTION, SOLUTION EPIDURAL; INFILTRATION; INTRACAUDAL; PERINEURAL at 16:06

## 2025-04-03 RX ADMIN — PHENYLEPHRINE HYDROCHLORIDE 100 MCG: 10 INJECTION INTRAVENOUS at 17:20

## 2025-04-03 RX ADMIN — HYDROMORPHONE HYDROCHLORIDE 0.25 MG: 1 INJECTION, SOLUTION INTRAMUSCULAR; INTRAVENOUS; SUBCUTANEOUS at 17:52

## 2025-04-03 RX ADMIN — VASOPRESSIN 1 UNITS: 20 INJECTION INTRAVENOUS at 16:17

## 2025-04-03 RX ADMIN — PHENYLEPHRINE HYDROCHLORIDE 100 MCG: 10 INJECTION INTRAVENOUS at 16:53

## 2025-04-03 RX ADMIN — ROCURONIUM BROMIDE 10 MG: 10 INJECTION INTRAVENOUS at 17:51

## 2025-04-03 RX ADMIN — PROPOFOL 20 MG: 10 INJECTION, EMULSION INTRAVENOUS at 16:09

## 2025-04-03 RX ADMIN — SUGAMMADEX 200 MG: 100 INJECTION, SOLUTION INTRAVENOUS at 19:18

## 2025-04-03 RX ADMIN — VASOPRESSIN 1 UNITS: 20 INJECTION INTRAVENOUS at 16:47

## 2025-04-03 RX ADMIN — ALBUMIN (HUMAN) 25 G: 0.25 INJECTION, SOLUTION INTRAVENOUS at 16:57

## 2025-04-03 RX ADMIN — PHENYLEPHRINE HYDROCHLORIDE 100 MCG: 10 INJECTION INTRAVENOUS at 16:20

## 2025-04-03 RX ADMIN — HYDROMORPHONE HYDROCHLORIDE 0.25 MG: 1 INJECTION, SOLUTION INTRAMUSCULAR; INTRAVENOUS; SUBCUTANEOUS at 19:21

## 2025-04-03 RX ADMIN — MAGNESIUM SULFATE HEPTAHYDRATE 1 G: 500 INJECTION, SOLUTION INTRAMUSCULAR; INTRAVENOUS at 16:40

## 2025-04-03 RX ADMIN — VASOPRESSIN 1 UNITS: 20 INJECTION INTRAVENOUS at 16:32

## 2025-04-03 RX ADMIN — DEXMEDETOMIDINE HYDROCHLORIDE 5 MCG: 100 INJECTION, SOLUTION INTRAVENOUS at 16:11

## 2025-04-03 RX ADMIN — ROCURONIUM BROMIDE 100 MG: 10 INJECTION INTRAVENOUS at 16:06

## 2025-04-03 RX ADMIN — VASOPRESSIN 0.5 UNITS: 20 INJECTION INTRAVENOUS at 18:26

## 2025-04-03 RX ADMIN — PHENYLEPHRINE HYDROCHLORIDE 100 MCG: 10 INJECTION INTRAVENOUS at 18:17

## 2025-04-03 ASSESSMENT — PAIN DESCRIPTION - FREQUENCY: FREQUENCY: CONTINUOUS

## 2025-04-03 ASSESSMENT — PAIN DESCRIPTION - DESCRIPTORS: DESCRIPTORS: STABBING

## 2025-04-03 ASSESSMENT — PAIN DESCRIPTION - LOCATION: LOCATION: ABDOMEN

## 2025-04-03 ASSESSMENT — PAIN SCALES - GENERAL
PAINLEVEL_OUTOF10: 0
PAINLEVEL_OUTOF10: 8

## 2025-04-03 ASSESSMENT — PAIN DESCRIPTION - ONSET: ONSET: GRADUAL

## 2025-04-03 ASSESSMENT — LIFESTYLE VARIABLES: HOW OFTEN DO YOU HAVE A DRINK CONTAINING ALCOHOL: NEVER

## 2025-04-03 ASSESSMENT — PAIN DESCRIPTION - PAIN TYPE: TYPE: ACUTE PAIN

## 2025-04-03 ASSESSMENT — PAIN SCALES - WONG BAKER: WONGBAKER_NUMERICALRESPONSE: NO HURT

## 2025-04-03 ASSESSMENT — PAIN DESCRIPTION - ORIENTATION: ORIENTATION: UPPER

## 2025-04-03 NOTE — ANESTHESIA PRE PROCEDURE
Department of Anesthesiology  Preprocedure Note       Name:  Urbano Levy   Age:  84 y.o.  :  1940                                          MRN:  [id number]         Date:  4/3/2025      Surgeon: Surgeon(s):  Izaiah Crenshaw DO    Procedure: Procedure(s):  Laparoscopic robotic assisted exploration of abdomen possible open    Medications prior to admission:   Prior to Admission medications    Medication Sig Start Date End Date Taking? Authorizing Provider   diclofenac sodium (VOLTAREN) 1 % GEL Apply 2 g topically 4 times daily    Daniel Rhodes MD   diphenhydrAMINE-APAP, sleep, (TYLENOL PM EXTRA STRENGTH)  MG tablet Take 1 tablet by mouth nightly as needed    Daniel Rhodes MD   furosemide (LASIX) 20 MG tablet Take 10 mg by mouth daily    Daniel Rhodes MD   VICTOZA 18 MG/3ML SOPN SC injection  21   Daniel Rhodes MD   nitroGLYCERIN (NITROSTAT) 0.4 MG SL tablet Place 1 tablet under the tongue every 5 minutes as needed    Daniel Rhodes MD   Magnesium 400 MG TABS Take 400 mg by mouth daily    Daniel Rhodes MD   Cyanocobalamin (B-12 SL) Place 1,000 tablets under the tongue daily    Daniel Rhodes MD   Cholecalciferol 50 MCG ( UT) CAPS Take by mouth daily    Daniel Rhodes MD   vitamin E 400 UNIT capsule Take 400 Units by mouth daily    Daniel Rhodes MD   metFORMIN (GLUCOPHAGE) 500 MG tablet  3/5/21   Daniel Rhodes MD   glimepiride (AMARYL) 4 MG tablet  21   Daniel Rhodes MD   isosorbide mononitrate (IMDUR) 30 MG extended release tablet Take 30 mg by mouth 2 times daily    Daniel Rhodes MD   prasugrel (EFFIENT) 10 MG TABS Take 5 mg by mouth daily 20   Daniel Rhodes MD   allopurinol (ZYLOPRIM) 100 MG tablet  3/24/21   Daniel Rhodes MD   ezetimibe (ZETIA) 10 MG tablet Take 10 mg by mouth daily 20  Daniel Rhodes MD   metoprolol tartrate (LOPRESSOR) 25 MG tablet TAKE 1

## 2025-04-03 NOTE — H&P
HOSPITALIST ADMISSION H&P      REASON FOR ADMISSION:  Closed loop SBO  ESTIMATED LENGTH OF STAY:  > 2 midnights---2-5 days    ATTENDING/ADMITTING PHYSICIAN: Munir Nagy MD MD  PCP: Lynette Han MD    HISTORY OF PRESENT ILLNESS:      The patient is a 84 y.o. female patient of Lynette Han MD who presents with abdominal pain---last BM--4.2.2025--CT AP 4.3.2025--consistent with closed loop SBO---lactic acid 2.2---leukocytosis----Dr. Crenshaw, , plans to take patient to surgery.    ASCVD---prior stents---see subnote below    HTN    Diabetes Mellitus Type 2    CKD--Stage 3a---relates had BRIAN with a prior surgery    Morbid obesity       See below for additional PMH.    Patient caxo-taytjjmrnp-dhovjexa-available records reviewed, including, but not limited to, ER reports--OSH records--office records---labs--imaging---EKG---personal notes       Past Medical History:   Diagnosis Date    Arthritis     Diabetes (HCC)     Hypertension            Past Surgical History:   Procedure Laterality Date    COLONOSCOPY      FEMUR SURGERY Right     [place]    GALLBLADDER SURGERY      HYSTERECTOMY (CERVIX STATUS UNKNOWN)      TONSILLECTOMY AND ADENOIDECTOMY         Medications Prior to Admission:    Medications Prior to Admission: diclofenac sodium (VOLTAREN) 1 % GEL, Apply 2 g topically 4 times daily  diphenhydrAMINE-APAP, sleep, (TYLENOL PM EXTRA STRENGTH)  MG tablet, Take 1 tablet by mouth nightly as needed  furosemide (LASIX) 20 MG tablet, Take 10 mg by mouth daily  VICTOZA 18 MG/3ML SOPN SC injection,   nitroGLYCERIN (NITROSTAT) 0.4 MG SL tablet, Place 1 tablet under the tongue every 5 minutes as needed  Magnesium 400 MG TABS, Take 400 mg by mouth daily  Cyanocobalamin (B-12 SL), Place 1,000 tablets under the tongue daily  Cholecalciferol 50 MCG (2000 UT) CAPS, Take by mouth daily  vitamin E 400 UNIT capsule, Take 400 Units by mouth daily  metFORMIN (GLUCOPHAGE) 500 MG tablet,   glimepiride (AMARYL) 4 MG  \"CREATININE\", \"CALCIUM\", \"GFRAA\", \"LABGLOM\", \"GLUCOSE\", \"GLU\"    ASSESSMENT:      Patient Active Problem List   Diagnosis    Discoloration of skin of toe    Arterial insufficiency of lower extremity    Morbid obesity with BMI of 50.0-59.9, adult    Type II diabetes mellitus with complication, uncontrolled    Diabetic polyneuropathy associated with type 2 diabetes mellitus    SBO (small bowel obstruction) (formerly [facility])       SAUL LOVING  84 WF [gaby Han, ;  jr Crenshaw, ]  DNR-CCA    ELIQUIS---EFFIENT--held    mounjaro--tirzepatide   SUPPLEMENTAL OXYGEN    Anti-infectives:    Closed loop SBO--4.3.2025---leukocytosis  Elevated lactic acid level----4.3.2025 = 2.2  Abdominal pain--4.3.2025--due to SBO  Hemocult--[+]          CT AP---4.3.2025---fluid--air--clustered small bowel loops--mesenteric                       edema--consistent with closed-loop obstruction    ASCVD          EKG----4.3.2025---SR--71--1st degree AVB         Cardiac catheterization--9.29.2022---stents Ramus  and RCA         Cardiac catheterization---8.29.2016--stent--location?      HTN  Hyperlipidemia   Diabetes Mellitus Type 2  CKD--Stage 3a  Gait-balance instability  Morbid obesity  HEARING IMPAIRMENT    PMH:   OA  PSH:    see above, right femur fracture, cholecystectomy, T&A, hysterectomy---cervix                unknown, colonoscopy, ulnar nerve repair, appendectomy, right MELISSA    Allergies:          Brilinta--ticagrelor--dyspnea  Intolerances:    Indomethacin--flushing,  ACTOS--pioglitazone--flushing, food--cramping, “statins,”                             Jardiance--empagliflozin--yeast infections    Code Status:  DNR-CCA  OARRS---4.3.2025---[-]      PLAN:     To surgery emergently---benefits > risks--[place]    Home medications reviewed    Cont'd supplemental oxygen    Diabetic admission--orders    Code status discussed---DNR-CCA    See orders     Note that over 55 minutes was spent in evaluation of the patient, review of the chart

## 2025-04-03 NOTE — CONSULTS
Transportation (Medical): No     Lack of Transportation (Non-Medical): No   Physical Activity: Insufficiently Active (2/5/2024)    Received from [employer]    Exercise Vital Sign     Days of Exercise per Week: 7 days     Minutes of Exercise per Session: 10 min   Stress: Not on file (2/5/2024)   Social Connections: Not on file (2/5/2024)   Intimate Partner Violence: Not on file   Housing Stability: Low Risk  (12/3/2024)    Received from [employer]    Housing Stability     What is your living situation today?: I have a steady place to live     Think about the place you live. Do you have problems with any of the following: Pests such as bugs, ants, or mice, Mold, Lead paint or pipes, Lack of heat, Oven or stove not working, Smoke detector...: None of the above     Current Housing Concerns: Not on file       Family History:   No family history on file.    REVIEW OF SYSTEMS:    CONSTITUTIONAL:  No recent weight gain/loss.  Energy level normal for pt.  HEENT:  negative  CARDIOVASCULAR:  No chest pain  GASTROINTESTINAL:  per HPI  GENITOURINARY:  No dysuria  HEMATOLOGIC/LYMPHATIC:  Reports easy bruising since being on eliquis.  ENDOCRINE: negative  Review of systems negative unless above.    PHYSICAL EXAM:    VITALS:  There were no vitals taken for this visit.  INTAKE/OUTPUT:   No intake or output data in the 24 hours ending 04/03/25 1506    CONSTITUTIONAL:  awake, alert, not distressed and morbidly obese  ENT:  normocephalic/atraumatic, without obvious abnormality  NECK:  supple, symmetrical, trachea midline   LUNGS:  clear to auscultation  CARDIOVASCULAR:  regular rate and rhythm  ABDOMEN: soft, obese but no significant distention, diffuse tenderness to palpation  MUSCULOSKELETAL:  negative, there is not obvious somatic dysfunction  NEUROLOGIC:  Mental Status Exam:  Level of Alertness:   alert  Orientation:   oriented to person, place, and time    CBC: No results found for: \"WBC\", \"RBC\", \"HGB\", \"HCT\", \"MCV\",  \"MCH\", \"MCHC\", \"RDW\", \"PLT\", \"MPV\"  CMP:  No results found for: \"NA\", \"K\", \"CL\", \"CO2\", \"BUN\", \"CREATININE\", \"GFRAA\", \"AGRATIO\", \"LABGLOM\", \"GLUCOSE\", \"GLU\", \"LABALBU\", \"CALCIUM\", \"BILITOT\", \"ALKPHOS\", \"AST\", \"ALT\"  BMP:  No results found for: \"NA\", \"K\", \"CL\", \"CO2\", \"BUN\", \"LABALBU\", \"CREATININE\", \"CALCIUM\", \"GFRAA\", \"LABGLOM\", \"GLUCOSE\", \"GLU\"  Hepatic Function Panel:  No results found for: \"ALKPHOS\", \"ALT\", \"AST\", \"BILITOT\", \"BILIDIR\", \"IBILI\", \"LABALBU\"    Lab work from [facility] reviewed including CBC, CMP, Lipase and Lactic acid.  Lactic slightly elevated at 2.2.  Stage 3 kidney dysfunction.  WBC, Hgb and Plts WNL.    Pertinent Radiology: CT abd/pel images and read from [facility] reviewed.  Showing closed loop obstruction with some haziness in mesenteric edema.      ASSESSMENT   Principal Problem:    SBO (small bowel obstruction) (HCC)  Resolved Problems:    * No resolved hospital problems. *    83 yo female with closed loop SBO.      PLAN    Will proceed to OR for diagnostic laparoscopy with possible CHERIE, possible SBR, possible conversion to exploratory laparotomy.  Planned procedures explained to patient.  Risks of procedures including bleeding, infection, scarring, pain, damage to surrounding structures, need for additional surgeries, possible need for bowel resection, possible conversion to open procedure and anesthesia risks are discussed and consent obtained.    Will give PCC now to reverse eliquis to reduce risks of major intraoperative bleeding.  Will start heparin/lovenox as soon as feasible after surgery.  Further recommendations after surgery.        Electronically signed by Izaiah Crenshaw DO  on 4/3/2025 at 3:06 PM

## 2025-04-03 NOTE — ANESTHESIA PROCEDURE NOTES
Peripheral Block    Patient location during procedure: OR  Reason for block: post-op pain management and at surgeon's request  Start time: 4/3/2025 7:07 PM  End time: 4/3/2025 7:12 PM  Staffing  Performed: resident/CRNA   Resident/CRNA: Brice Cullen APRN - CRNA  Performed by: Brice Cullen APRN - CRNA  Authorized by: Brice Cullen APRN - CRNA    Preanesthetic Checklist  Completed: patient identified, IV checked, site marked, risks and benefits discussed, surgical/procedural consents, equipment checked, pre-op evaluation, timeout performed, anesthesia consent given, oxygen available, monitors applied/VS acknowledged, fire risk safety assessment completed and verbalized and blood product R/B/A discussed and consented  Peripheral Block   Patient position: supine  Prep: ChloraPrep  Provider prep: mask, sterile gloves and sterile gown  Patient monitoring: cardiac monitor, continuous pulse ox, continuous capnometry, oxygen, IV access and frequent blood pressure checks  Block type: TAP  Laterality: bilateral  Injection technique: single-shot  Guidance: ultrasound guided    Needle   Needle type: insulated echogenic nerve stimulator needle   Needle gauge: 21 G  Needle localization: anatomical landmarks and ultrasound guidance  Needle length: 8 cm  Assessment   Injection assessment: negative aspiration for heme, no paresthesia on injection, local visualized surrounding nerve on ultrasound and no intravascular symptoms  Slow fractionated injection: yes  Hemodynamics: stable  Outcomes: patient tolerated procedure well    Medications Administered  BUPivacaine (MARCAINE) PF injection 0.25% - Perineural, Abdomen LLQ (Left Lower Quadrant)   20 mL - 4/3/2025 7:07:00 PM  BUPivacaine (MARCAINE) injection 0.25% - Perineural, Abdomen RLQ (Right Lower Quadrant)   20 mL - 4/3/2025 7:07:00 PM

## 2025-04-03 NOTE — BRIEF OP NOTE
Brief Postoperative Note      Patient: Urbano Levy  YOB: 1940  MRN: [id number]    Date of Procedure: 4/3/2025    Pre-Op Diagnosis Codes:      * Small bowel obstruction (HCC) [K56.609]    Post-Op Diagnosis: Same       Procedure(s):  Laparoscopic robotic assisted exploration of abdomen and lysis of adhesions converted to open at 1750    Surgeon(s):  Izaiah Crenshaw DO    Assistant:  * No surgical staff found *    Anesthesia: General    Estimated Blood Loss (mL): 100mL    Complications: None    Specimens:   * No specimens in log *    Implants:  * No implants in log *      Drains:   NG/OG/NJ/NE Tube Nasogastric 18 fr Left nostril (Active)       Urinary Catheter 04/03/25 Doan (Active)       Findings:  Infection Present At Time Of Surgery (PATOS) (choose all levels that have infection present):  No infection present  Other Findings: Closed loop obstruction secondary to significant adhesions.  Small enterotomy during procedure with small spillage.    This procedure was not performed to treat colon cancer through resection    Electronically signed by Izaiah Crenshaw DO on 4/3/2025 at 7:36 PM

## 2025-04-03 NOTE — ANESTHESIA POSTPROCEDURE EVALUATION
Department of Anesthesiology  Postprocedure Note    Patient: Urbano Levy  MRN: [id number]  YOB: 1940  Date of evaluation: 4/3/2025    Procedure Summary       Date: 04/03/25 Room / Location: [address]    Anesthesia Start: 1559 Anesthesia Stop: 1940    Procedure: Laparoscopic robotic assisted exploration of abdomen and lysis of adhesions converted to open at 1750 (Abdomen) Diagnosis: Small bowel obstruction (HCC)    Surgeons: Izaiah Crenshaw DO Responsible Provider: Brice Cullen APRN - CRNA    Anesthesia Type: General ASA Status: 4 - Emergent            Anesthesia Type: General    Tierney Phase I: Tierney Score: 8    Tierney Phase II:      Anesthesia Post Evaluation    Patient location during evaluation: PACU  Patient participation: complete - patient participated  Level of consciousness: responsive to light touch  Airway patency: patent  Nausea & Vomiting: no nausea  Cardiovascular status: hemodynamically stable  Respiratory status: face mask  Hydration status: euvolemic  Multimodal analgesia pain management approach  Pain management: adequate and satisfactory to patient    No notable events documented.

## 2025-04-03 NOTE — PLAN OF CARE
Problem: Chronic Conditions and Co-morbidities  Goal: Patient's chronic conditions and co-morbidity symptoms are monitored and maintained or improved  Outcome: Progressing     Problem: Discharge Planning  Goal: Discharge to home or other facility with appropriate resources  Outcome: Progressing  Flowsheets (Taken 4/3/2025 1522 by Rosalva Scherer, RN)  Discharge to home or other facility with appropriate resources:   Arrange for needed discharge resources and transportation as appropriate   Identify barriers to discharge with patient and caregiver   Identify discharge learning needs (meds, wound care, etc)     Problem: Safety - Adult  Goal: Free from fall injury  Outcome: Progressing     Problem: ABCDS Injury Assessment  Goal: Absence of physical injury  Outcome: Progressing     Problem: Skin/Tissue Integrity  Goal: Skin integrity remains intact  Description: 1.  Monitor for areas of redness and/or skin breakdown  2.  Assess vascular access sites hourly  3.  Every 4-6 hours minimum:  Change oxygen saturation probe site  4.  Every 4-6 hours:  If on nasal continuous positive airway pressure, respiratory therapy assess nares and determine need for appliance change or resting period  Outcome: Progressing

## 2025-04-04 LAB
ANION GAP SERPL CALCULATED.3IONS-SCNC: 11 MMOL/L (ref 9–16)
BASOPHILS # BLD: <0.03 K/UL (ref 0–0.2)
BASOPHILS NFR BLD: 0 % (ref 0–2)
BUN SERPL-MCNC: 31 MG/DL (ref 8–23)
BUN/CREAT SERPL: 34 (ref 9–20)
CALCIUM SERPL-MCNC: 9.3 MG/DL (ref 8.6–10.4)
CHLORIDE SERPL-SCNC: 103 MMOL/L (ref 98–107)
CO2 SERPL-SCNC: 25 MMOL/L (ref 20–31)
CREAT SERPL-MCNC: 0.9 MG/DL (ref 0.6–0.9)
EKG ATRIAL RATE: 71 BPM
EKG P AXIS: 16 DEGREES
EKG P-R INTERVAL: 324 MS
EKG Q-T INTERVAL: 406 MS
EKG QRS DURATION: 88 MS
EKG QTC CALCULATION (BAZETT): 441 MS
EKG R AXIS: 56 DEGREES
EKG T AXIS: 20 DEGREES
EKG VENTRICULAR RATE: 71 BPM
EOSINOPHIL # BLD: <0.03 K/UL (ref 0–0.44)
EOSINOPHILS RELATIVE PERCENT: 0 % (ref 1–4)
ERYTHROCYTE [DISTWIDTH] IN BLOOD BY AUTOMATED COUNT: 14.4 % (ref 11.8–14.4)
EST. AVERAGE GLUCOSE BLD GHB EST-MCNC: 151 MG/DL
GFR, ESTIMATED: 62 ML/MIN/1.73M2
GLUCOSE BLD-MCNC: 118 MG/DL (ref 65–105)
GLUCOSE BLD-MCNC: 154 MG/DL (ref 65–105)
GLUCOSE BLD-MCNC: 197 MG/DL (ref 65–105)
GLUCOSE SERPL-MCNC: 199 MG/DL (ref 74–99)
HBA1C MFR BLD: 6.9 % (ref 4–6)
HCT VFR BLD AUTO: 32.4 % (ref 36.3–47.1)
HCT VFR BLD AUTO: 33.2 % (ref 36.3–47.1)
HCT VFR BLD AUTO: 35.1 % (ref 36.3–47.1)
HGB BLD-MCNC: 10.4 G/DL (ref 11.9–15.1)
HGB BLD-MCNC: 10.6 G/DL (ref 11.9–15.1)
HGB BLD-MCNC: 11.1 G/DL (ref 11.9–15.1)
IMM GRANULOCYTES # BLD AUTO: 0.06 K/UL (ref 0–0.3)
IMM GRANULOCYTES NFR BLD: 0 %
LYMPHOCYTES NFR BLD: 1.2 K/UL (ref 1.1–3.7)
LYMPHOCYTES RELATIVE PERCENT: 8 % (ref 24–43)
MAGNESIUM SERPL-MCNC: 2 MG/DL (ref 1.6–2.4)
MCH RBC QN AUTO: 29.5 PG (ref 25.2–33.5)
MCHC RBC AUTO-ENTMCNC: 31.6 G/DL (ref 25.2–33.5)
MCV RBC AUTO: 93.4 FL (ref 82.6–102.9)
MONOCYTES NFR BLD: 1.09 K/UL (ref 0.1–1.2)
MONOCYTES NFR BLD: 7 % (ref 3–12)
NEUTROPHILS NFR BLD: 84 % (ref 36–65)
NEUTS SEG NFR BLD: 12.52 K/UL (ref 1.5–8.1)
NRBC BLD-RTO: 0 PER 100 WBC
PLATELET # BLD AUTO: 212 K/UL (ref 138–453)
PMV BLD AUTO: 10.7 FL (ref 8.1–13.5)
POTASSIUM SERPL-SCNC: 4.2 MMOL/L (ref 3.7–5.3)
RBC # BLD AUTO: 3.76 M/UL (ref 3.95–5.11)
SODIUM SERPL-SCNC: 139 MMOL/L (ref 136–145)
WBC OTHER # BLD: 14.9 K/UL (ref 3.5–11.3)

## 2025-04-04 PROCEDURE — 2700000000 HC OXYGEN THERAPY PER DAY

## 2025-04-04 PROCEDURE — 94669 MECHANICAL CHEST WALL OSCILL: CPT

## 2025-04-04 PROCEDURE — 2060000000 HC ICU INTERMEDIATE R&B

## 2025-04-04 PROCEDURE — 2500000003 HC RX 250 WO HCPCS

## 2025-04-04 PROCEDURE — 6360000002 HC RX W HCPCS: Performed by: SURGERY

## 2025-04-04 PROCEDURE — 94761 N-INVAS EAR/PLS OXIMETRY MLT: CPT

## 2025-04-04 PROCEDURE — 85025 COMPLETE CBC W/AUTO DIFF WBC: CPT

## 2025-04-04 PROCEDURE — 85014 HEMATOCRIT: CPT

## 2025-04-04 PROCEDURE — 94640 AIRWAY INHALATION TREATMENT: CPT

## 2025-04-04 PROCEDURE — 6370000000 HC RX 637 (ALT 250 FOR IP)

## 2025-04-04 PROCEDURE — 85018 HEMOGLOBIN: CPT

## 2025-04-04 PROCEDURE — 83735 ASSAY OF MAGNESIUM: CPT

## 2025-04-04 PROCEDURE — 99231 SBSQ HOSP IP/OBS SF/LOW 25: CPT | Performed by: INTERNAL MEDICINE

## 2025-04-04 PROCEDURE — 36415 COLL VENOUS BLD VENIPUNCTURE: CPT

## 2025-04-04 PROCEDURE — 6370000000 HC RX 637 (ALT 250 FOR IP): Performed by: SURGERY

## 2025-04-04 PROCEDURE — 99024 POSTOP FOLLOW-UP VISIT: CPT | Performed by: PHYSICIAN ASSISTANT

## 2025-04-04 PROCEDURE — 97161 PT EVAL LOW COMPLEX 20 MIN: CPT | Performed by: PHYSICAL THERAPIST

## 2025-04-04 PROCEDURE — 94667 MNPJ CHEST WALL 1ST: CPT

## 2025-04-04 PROCEDURE — 80048 BASIC METABOLIC PNL TOTAL CA: CPT

## 2025-04-04 PROCEDURE — 6370000000 HC RX 637 (ALT 250 FOR IP): Performed by: INTERNAL MEDICINE

## 2025-04-04 PROCEDURE — 83036 HEMOGLOBIN GLYCOSYLATED A1C: CPT

## 2025-04-04 PROCEDURE — 2580000003 HC RX 258

## 2025-04-04 PROCEDURE — 82947 ASSAY GLUCOSE BLOOD QUANT: CPT

## 2025-04-04 RX ORDER — INSULIN GLARGINE 100 [IU]/ML
20 INJECTION, SOLUTION SUBCUTANEOUS DAILY
Status: DISCONTINUED | OUTPATIENT
Start: 2025-04-04 | End: 2025-04-08 | Stop reason: HOSPADM

## 2025-04-04 RX ORDER — OXYCODONE AND ACETAMINOPHEN 5; 325 MG/1; MG/1
2 TABLET ORAL EVERY 4 HOURS PRN
Refills: 0 | Status: DISCONTINUED | OUTPATIENT
Start: 2025-04-04 | End: 2025-04-08 | Stop reason: HOSPADM

## 2025-04-04 RX ORDER — INSULIN LISPRO 100 [IU]/ML
0-16 INJECTION, SOLUTION INTRAVENOUS; SUBCUTANEOUS EVERY 6 HOURS
Status: DISCONTINUED | OUTPATIENT
Start: 2025-04-04 | End: 2025-04-06

## 2025-04-04 RX ORDER — OXYCODONE AND ACETAMINOPHEN 5; 325 MG/1; MG/1
1 TABLET ORAL EVERY 4 HOURS PRN
Refills: 0 | Status: DISCONTINUED | OUTPATIENT
Start: 2025-04-04 | End: 2025-04-08 | Stop reason: HOSPADM

## 2025-04-04 RX ORDER — AMMONIUM LACTATE 12 G/100G
LOTION TOPICAL DAILY
Status: DISCONTINUED | OUTPATIENT
Start: 2025-04-04 | End: 2025-04-08 | Stop reason: HOSPADM

## 2025-04-04 RX ADMIN — MORPHINE SULFATE 2 MG: 2 INJECTION, SOLUTION INTRAMUSCULAR; INTRAVENOUS at 09:56

## 2025-04-04 RX ADMIN — METOPROLOL TARTRATE 25 MG: 25 TABLET, FILM COATED ORAL at 08:31

## 2025-04-04 RX ADMIN — MICONAZOLE NITRATE: 20 POWDER TOPICAL at 04:14

## 2025-04-04 RX ADMIN — APIXABAN 5 MG: 5 TABLET, FILM COATED ORAL at 21:25

## 2025-04-04 RX ADMIN — IPRATROPIUM BROMIDE AND ALBUTEROL SULFATE 1 DOSE: .5; 3 SOLUTION RESPIRATORY (INHALATION) at 12:23

## 2025-04-04 RX ADMIN — SODIUM CHLORIDE, PRESERVATIVE FREE 10 ML: 5 INJECTION INTRAVENOUS at 21:27

## 2025-04-04 RX ADMIN — OXYCODONE AND ACETAMINOPHEN 1 TABLET: 325; 5 TABLET ORAL at 20:08

## 2025-04-04 RX ADMIN — SODIUM CHLORIDE: 0.9 INJECTION, SOLUTION INTRAVENOUS at 18:14

## 2025-04-04 RX ADMIN — INSULIN GLARGINE 20 UNITS: 100 INJECTION, SOLUTION SUBCUTANEOUS at 08:31

## 2025-04-04 RX ADMIN — IPRATROPIUM BROMIDE AND ALBUTEROL SULFATE 1 DOSE: .5; 3 SOLUTION RESPIRATORY (INHALATION) at 19:42

## 2025-04-04 RX ADMIN — IPRATROPIUM BROMIDE AND ALBUTEROL SULFATE 1 DOSE: .5; 3 SOLUTION RESPIRATORY (INHALATION) at 08:15

## 2025-04-04 RX ADMIN — Medication 2000 MG: at 04:15

## 2025-04-04 RX ADMIN — APIXABAN 5 MG: 5 TABLET, FILM COATED ORAL at 11:15

## 2025-04-04 RX ADMIN — MICONAZOLE NITRATE: 20 POWDER TOPICAL at 21:28

## 2025-04-04 RX ADMIN — MICONAZOLE NITRATE: 20 POWDER TOPICAL at 08:32

## 2025-04-04 RX ADMIN — ISOSORBIDE MONONITRATE 60 MG: 60 TABLET, EXTENDED RELEASE ORAL at 08:31

## 2025-04-04 RX ADMIN — METOPROLOL TARTRATE 25 MG: 25 TABLET, FILM COATED ORAL at 21:20

## 2025-04-04 RX ADMIN — INSULIN LISPRO 1 UNITS: 100 INJECTION, SOLUTION INTRAVENOUS; SUBCUTANEOUS at 00:56

## 2025-04-04 RX ADMIN — SODIUM CHLORIDE: 0.9 INJECTION, SOLUTION INTRAVENOUS at 07:22

## 2025-04-04 RX ADMIN — Medication 2000 MG: at 13:30

## 2025-04-04 RX ADMIN — Medication: at 08:35

## 2025-04-04 RX ADMIN — INSULIN LISPRO 1 UNITS: 100 INJECTION, SOLUTION INTRAVENOUS; SUBCUTANEOUS at 07:01

## 2025-04-04 ASSESSMENT — PAIN SCALES - GENERAL
PAINLEVEL_OUTOF10: 4
PAINLEVEL_OUTOF10: 4
PAINLEVEL_OUTOF10: 2
PAINLEVEL_OUTOF10: 5

## 2025-04-04 ASSESSMENT — PAIN DESCRIPTION - LOCATION
LOCATION: ABDOMEN

## 2025-04-04 ASSESSMENT — PAIN DESCRIPTION - DESCRIPTORS: DESCRIPTORS: DISCOMFORT

## 2025-04-04 ASSESSMENT — PAIN DESCRIPTION - ORIENTATION: ORIENTATION: LEFT;RIGHT;UPPER

## 2025-04-04 NOTE — PLAN OF CARE
Problem: Chronic Conditions and Co-morbidities  Goal: Patient's chronic conditions and co-morbidity symptoms are monitored and maintained or improved  4/4/2025 0752 by Shock, Rylee, RN  Outcome: Progressing  4/3/2025 2205 by Laura Gutierrez RN  Outcome: Progressing     Problem: Discharge Planning  Goal: Discharge to home or other facility with appropriate resources  4/4/2025 0752 by Shock, Rylee, RN  Outcome: Progressing  4/3/2025 2205 by Laura Gutierrez RN  Outcome: Progressing     Problem: Safety - Adult  Goal: Free from fall injury  4/4/2025 0752 by Shock, Rylee, RN  Outcome: Progressing  4/3/2025 2205 by Laura Gutierrez RN  Outcome: Progressing     Problem: ABCDS Injury Assessment  Goal: Absence of physical injury  4/4/2025 0752 by Shock, Rylee, RN  Outcome: Progressing  4/3/2025 2205 by Laura Gutierrez RN  Outcome: Progressing     Problem: Skin/Tissue Integrity  Goal: Skin integrity remains intact  Description: 1.  Monitor for areas of redness and/or skin breakdown  2.  Assess vascular access sites hourly  3.  Every 4-6 hours minimum:  Change oxygen saturation probe site  4.  Every 4-6 hours:  If on nasal continuous positive airway pressure, respiratory therapy assess nares and determine need for appliance change or resting period  4/4/2025 0752 by Shock, Rylee, RN  Outcome: Progressing  4/3/2025 2205 by Laura Gutierrez RN  Outcome: Progressing     Problem: Pain  Goal: Verbalizes/displays adequate comfort level or baseline comfort level  4/4/2025 0752 by Shock, Rylee, RN  Outcome: Progressing  4/3/2025 2205 by Laura Gutierrez RN  Outcome: Progressing

## 2025-04-04 NOTE — CARE COORDINATION
DISCHARGE BARRIERS       Reason for Referral:  SW completed a Psychosocial Assessment for evaluation of patient's mental health, social status, and functional capacity within the community. Saul Levy is a 84 y.o. female admitted due to SBO (small bowel obstruction) ([facility]). Patient alone. SW provided supportive listening while patient discussed past medical history and events leading up to hospitalization.       Mental Status:  Alert, oriented, and engaging during assessment.     Decision Making:  Makes own decisions.     Family/Social/Home Environment: lives alone    Employment status: Retired     Health Insurance:     Active Insurance as of 4/3/2025       Primary Coverage       Payor Plan Insurance Group Employer/Plan Group    [place] MED[place] [facility] OFFICES ONLY [id number]       Payor Plan Address Payor Plan Phone Number Payor Plan Fax Number Effective Dates    [address] [address]   1/1/2025 - None Entered    [facility] [address]         Subscriber Name Subscriber Birth Date Member ID       SAUL LEVY 1940 [id number]                     Support: Discussed a good social support network     Current Services:  None      Current DMEs: [employer] Scooter     PCP: Lynette Han MD and repots no issues affording medication.      status:   No     ADLs and means of transportation: Independent in ADLs/IADLs in ADLs prior to hospitalization and unable to transport self.    Food insecurity or needed financial assistance: Denies any food insecurity or financial concerns at this time.    Income Source: social security and [employer] pension     ACP and Code Status:  SW discussed an Advance Directive which included the patient's choices for care and treatment in the case of a health event that adversely affects decision-making abilities. SW provided education and resources. Saul Levy has no questions at this time and has agreed to keep me up-to-date should anything change.    Saul Levy is a  DNR-CCA status and has NO advanced directive - not interested in additional information.      Collaborative List of SNF/ECF/HH were provided: offered, declined No discharge order at this time.    Anticipated Needs/Discharge Plan:  Spoke with patient/family/representative about discharge plan. Patient/Family/Representative verbalizes understanding of the plan of care and denies discharge needs or further services at this time. SW provided business card. SW will continue to monitor needs and assist as appropriate.                Electronically signed by PARTH Acosta on 4/4/2025 at 11:12 AM

## 2025-04-04 NOTE — PROGRESS NOTES
Pt son brought in [employer] bag full of pt's home meds which were all vitamins. RN asked Dr. Nagy if he was going to reorder these, not reordering these vitamins. RN gave bag back to the son to take home when he left.

## 2025-04-04 NOTE — PLAN OF CARE
Problem: Chronic Conditions and Co-morbidities  Goal: Patient's chronic conditions and co-morbidity symptoms are monitored and maintained or improved  4/3/2025 2205 by Laura Gutierrez RN  Outcome: Progressing  4/3/2025 1545 by Rafael Garcia RN  Outcome: Progressing     Problem: Discharge Planning  Goal: Discharge to home or other facility with appropriate resources  4/3/2025 2205 by Laura Gutierrez RN  Outcome: Progressing  4/3/2025 1545 by Rafael Garcia RN  Outcome: Progressing  Flowsheets (Taken 4/3/2025 1522 by Rosalva Scherer RN)  Discharge to home or other facility with appropriate resources:   Arrange for needed discharge resources and transportation as appropriate   Identify barriers to discharge with patient and caregiver   Identify discharge learning needs (meds, wound care, etc)     Problem: Safety - Adult  Goal: Free from fall injury  4/3/2025 2205 by Laura Gutierrez RN  Outcome: Progressing  4/3/2025 1545 by Rafael Garcia RN  Outcome: Progressing     Problem: ABCDS Injury Assessment  Goal: Absence of physical injury  4/3/2025 2205 by Laura Gutierrez RN  Outcome: Progressing  4/3/2025 1545 by Rafael Garcia RN  Outcome: Progressing     Problem: Skin/Tissue Integrity  Goal: Skin integrity remains intact  Description: 1.  Monitor for areas of redness and/or skin breakdown  2.  Assess vascular access sites hourly  3.  Every 4-6 hours minimum:  Change oxygen saturation probe site  4.  Every 4-6 hours:  If on nasal continuous positive airway pressure, respiratory therapy assess nares and determine need for appliance change or resting period  4/3/2025 2205 by Laura Gutierrez RN  Outcome: Progressing  4/3/2025 1545 by Rafael Garcia RN  Outcome: Progressing     Problem: Pain  Goal: Verbalizes/displays adequate comfort level or baseline comfort level  4/3/2025 2205 by Laura Gutierrez RN  Outcome: Progressing  4/3/2025 1545 by Rafael Garcia RN  Outcome: Progressing

## 2025-04-04 NOTE — PROGRESS NOTES
General Surgery  Daily Progress Note  Pt Name: Urbano Levy  Medical Record Number: [id number]  Date of Birth 1940   Today's Date: 4/4/2025      SUBJECTIVE  POD 1 Robotic-assisted diagnostic laparoscopy with laparoscopic lysis of adhesions. Conversion to open laparotomy with lysis of adhesions    Patient states pain is mild    Is thirsty, NG tube in place on low intermittent wall suction    Denies any bowel movement or flatus    Doan is in place    OBJECTIVE  CURRENT VITALS BP (!) 120/50   Pulse 78   Temp 97.8 °F (36.6 °C) (Skin)   Resp 16   Ht 1.575 m (5' 2\")   Wt 115.9 kg (255 lb 9.6 oz)   SpO2 96%   BMI 46.75 kg/m²   GENERAL: Alert and cooperative, no obvious distress sitting in the bed comfortable  LUNGS: Speaking in complete sentences, no increased work of breathing, comfortable  HEART: Irregular irregular, intact right radial pulse 2+  ABDOMEN: Soft, nondistended, midline incision is bandaged with a little seepage on the overlying gauze, staples present and no dehiscence appreciated, left trocar site incisions show saturated bandages underlying the Tegaderm, Doan catheter in place  EXTERMITY: No edema  24 HR INTAKE/OUTPUT :   Intake/Output Summary (Last 24 hours) at 4/4/2025 0947  Last data filed at 4/4/2025 0617  Gross per 24 hour   Intake 2243.39 ml   Output 625 ml   Net 1618.39 ml     DRAIN/TUBE OUTPUT : NG/OG/NJ/NE Tube Nasogastric 18 fr Left nostril-Output (mL): 250 ml    Current Facility-Administered Medications   Medication Dose Route Frequency Provider Last Rate Last Admin    miconazole (MICOTIN) 2 % powder   Topical BID Loren Prince APRN - NP   Given at 04/04/25 0832    ammonium lactate (LAC-HYDRIN) 12 % lotion   Topical Daily Loren Prince APRN - NP   Given at 04/04/25 0835    insulin glargine (LANTUS) injection vial 20 Units  20 Units SubCUTAneous Daily Munir Nagy MD   20 Units at 04/04/25 0831    insulin lispro (HUMALOG,ADMELOG) injection vial 0-16 Units  0-16 Units

## 2025-04-04 NOTE — PROGRESS NOTES
Pt refused therapy this morning. RN went in this afternoon and educated patient on benefits and risks of working with therapy especially after surgery. Pt agreeable to get washed up on commode and get into recliner at this time. Pt would benefit from having therapy services due to not being able to move the best. Pt made aware of her benefiting from therapy.

## 2025-04-04 NOTE — PROGRESS NOTES
Physical Therapy  Facility/Department: [facility]  PROGRESSIVE CARE  Physical Therapy Initial Assessment    Name: Urbano Levy  : 1940  MRN: [id number]  Date of Service: 2025    Discharge Recommendations:  Subacute/Skilled Nursing Facility, Continue to assess pending progress          Patient was very adamant about NOT wanting inpatient PT services at this time.  States that she is comfortable in bed right now and does not want to move because she finds it hard to get comfortable. Discussed the need to assess movement and functional transfers and patient stated: \"I will have my son or daughters or my home nurse and we'll be able to handle getting me in and out of my house whenever I get discharged.\" Patient called her son on the phone and PT discussed this with son and he verbally agreed with the patient's statement that he and his family will take on the care of the patient upon discharge.       Patient Diagnosis(es): There were no encounter diagnoses.  Past Medical History:  has a past medical history of Arthritis, Chest pain, Diabetes (HCC), Early dry stage nonexudative age-related macular degeneration of both eyes, Family history of melanoma, Gait difficulty, Glaucoma suspect of both eyes, H/O colonoscopy, Hypertension, Posterior vitreous detachment of both eyes, Primary osteoarthritis of both knees, S/P coronary artery stent placement, Seborrheic dermatitis, and Trichiasis of left lower eyelid.  Past Surgical History:  has a past surgical history that includes Femur Surgery (Right); Gallbladder surgery; Tonsillectomy and adenoidectomy; Hysterectomy; Colonoscopy; and laparoscopy (N/A, 4/3/2025).    Assessment  Assessment: unable to perform a full assessment due to patient refusal  Decision Making: Low Complexity  Activity Tolerance  Activity Tolerance: Other (comment)  Activity Tolerance Comments: patient was very adamant about refusing all PT inpatient services at this time    Plan  Physical Therapy  comfortable there and does not want to move because she finds it hard to get comfortable. Discussed the need to assess movement and functional transfers and patient stated: \"I will have my son or daughters or my home nurse and we'll be able to handle getting me in and out of my house whenever I get discharged.\"        Strength RLE  Comment: unable to assess  Strength LLE  Comment: unable to assess        Bed mobility  Bridging: Unable to assess  Rolling to Left: Unable to assess  Rolling to Right: Unable to assess  Supine to Sit: Unable to assess  Sit to Supine: Unable to assess  Scooting: Unable to assess  Transfers  Sit to Stand: Unable to assess  Stand to Sit: Unable to assess  Bed to Chair: Unable to assess  Stand Pivot Transfers: Unable to assess  Lateral Transfers: Unable to assess  Ambulation  Assistance: Unable to assess     Goals  Short Term Goals  Time Frame for Short Term Goals: not picked up for inpatient therapy due to patient refusal     Therapy Time   Individual Concurrent Group Co-treatment   Time In  1115         Time Out 1144         Minutes  29               Piero Rebolledo, PT, DPT

## 2025-04-04 NOTE — OP NOTE
docked and targeted the robot to the mid lower abdomen.  I then went to the robotic console after placing my instruments, which initially were a Force bipolar and hot scissors.  Once there, I started to take down those adhesions of the omentum to the anterior abdominal wall lower and again this took a little bit of time, but we were able to progress fairly well.  Once I had the adhesions down, I attempted to reflect up the omentum.  On the right side, I was not having any difficulty, but on that left side it seemed that the omentum was probably tethered down lower more posteriorly in the pelvis, which was making reflection impossible.  There was some fluid in the abdomen and so I used a suction  to clear that out just so I would be able to see down there a little better and then started to try and lyse these adhesions of the omentum.  This went pretty well for the first portion, but as we were getting lower, the adhesions were becoming more dense and it looked like a lot of this was centered on the sigmoid colon.  After a while where I was not making much progress, I decided to go ahead and try and run the bowel.  I turned my attention to the right abdomen and identified the terminal ileum, and I started to run the small intestine back from that point.  Certainly, the distal small intestine was very decompressed and as I started to run it more put proximally, got about three-quarters of the way up, and then was not able to mobilize anymore intestine and it looked like the intestine that was involved in the bowel obstruction was actually trapped under that area of omentum and was probably tethered down best I could tell.  Fortunately, I was able   to come from the left side and come under the omentum on that side and at this point actually identified the area of bowel that was of concern and it did appear very dilated and looked a little dusky.  I continued to try to mobilize up the omentum and was just not  really getting good visualization just because of the location of the adhesions, the patient's body habitus, and just how dense the adhesions were and so I started to try and mobilize the intestine to see if I could just maybe get under there a little better to see what was actually causing the tethering of the intestine.  As I was doing so, unfortunately created a small enterotomy as we were trying to mobilize that, and I think this was just because of the condition of the bowel and how distended it was.  As I was grasping it and started to move it, it created that small enterotomy, and there was leakage of clear appearing fluid.  With that done, I continued to see if there was any way to get this mobilized up, but just because of where we had to dock from and just what were fairly dense adhesions and just difficult anatomy here, I just was not making any more headway and at this point decided to convert to open.  At this point, we probably spent approximately an hour to an hour and a half just doing the lysis of adhesions robotically.  We went ahead and removed the robotic instruments and undocked the robot and then I scrubbed back into the procedure.  We had instruments opened, and I went ahead and made a midline incision from approximately three-quarters up between the umbilicus and the xiphoid down to just below the umbilicus.  Dissection was carried down through the subcutaneous tissue to the level of the fascia, and once here, I opened the fascia sharply with electrocautery.  Once I was into the abdomen, of course there was the evacuation of CO2 at this point, and I went ahead and removed the trocars at that point and then opened the incision through the fascia along its length.  Once done, we went ahead and suctioned out some additional fluid there and then I used a Lyman retractor to retract the left abdominal wall just to see if I could get a better view of these adhesions.  Even then, I started to

## 2025-04-04 NOTE — PROGRESS NOTES
Hospitalist Progress Note    Patient:  Urbano Levy     YOB: 1940    MRN: [id number]   Admit date: 4/3/2025     Acct: [id number]     PCP: Lynette Han MD    CC--Interval History: POD 1 LRA to open exploratory laparotomy--[facility]---4.3.2025---[facility]---NGT---[facility]    ASCVD----HTN--/51    DM2----blood glucose levels above goal---Lantus 10 --> 20---goal 140-180    WBC--14.9---expected postoperatively    Needs to take deep breaths and ambulate  !!!!!!!    See note below     All other ROS negative except noted in HPI    Diet:  Diet NPO Exceptions are: Sips of Water with Meds    Medications:  Scheduled Meds:   miconazole   Topical BID    ammonium lactate   Topical Daily    sodium chloride flush  10 mL IntraVENous 2 times per day    ipratropium 0.5 mg-albuterol 2.5 mg  1 Dose Inhalation Q4H WA RT    ceFAZolin  2,000 mg IntraVENous q8h    isosorbide mononitrate  60 mg Oral Daily    metoprolol tartrate  25 mg Oral BID    sodium chloride flush  5-40 mL IntraVENous 2 times per day    insulin glargine  10 Units SubCUTAneous Daily    insulin lispro  0-4 Units SubCUTAneous 4 times per day     Continuous Infusions:   sodium chloride      dextrose      sodium chloride      sodium chloride 100 mL/hr at 04/04/25 0722     PRN Meds:sodium chloride flush, sodium chloride, ondansetron, polyethylene glycol, acetaminophen, glucose, dextrose bolus **OR** dextrose bolus, glucagon (rDNA), dextrose, ipratropium 0.5 mg-albuterol 2.5 mg, morphine **OR** morphine, sodium chloride flush, sodium chloride    Objective:  Labs:  CBC with Differential:    Lab Results   Component Value Date/Time    WBC 14.9 04/04/2025 05:08 AM    RBC 3.76 04/04/2025 05:08 AM    HGB 10.6 04/04/2025 11:51 AM    HCT 33.2 04/04/2025 11:51 AM     04/04/2025 05:08 AM    MCV 93.4 04/04/2025 05:08 AM    MCH 29.5 04/04/2025 05:08 AM    MCHC 31.6 04/04/2025 05:08 AM    RDW 14.4 04/04/2025 05:08 AM    LYMPHOPCT 8 04/04/2025 05:08 AM    MONOPCT 7

## 2025-04-04 NOTE — CARE COORDINATION
Case Management Assessment  Initial Evaluation    Date/Time of Evaluation: 4/4/2025 11:03 AM  Assessment Completed by: Eric Sullivan RN    If patient is discharged prior to next notation, then this note serves as note for discharge by case management.    Date / Time: 4/3/2025  3:02 PM  Patient Name: Urbano Levy                     YOB: 1940  Diagnosis: Small bowel obstruction (HCC) [K56.609]  SBO (small bowel obstruction) (HCC) [K56.609]                         Patient Admission Status: Inpatient   Readmission Risk (Low < 19, Mod (19-27), High > 27): Readmission Risk Score: 13.1    Current PCP: Lynette Han MD  Last Visit:      Chart Reviewed: Yes      History Provided by: Patient  Patient Orientation: Alert and Oriented    Patient Cognition: Alert    Hospitalization in the last 30 days (Readmission):  No           Advance Directives:      Code Status: DNR-CCA   Patient's Primary Decision Maker is: Legal Next of Kin      Discharge Planning:    Patient lives with: Alone   Type of Home: House  Primary Care Giver: Self  Patient Support Systems include: Children, Home Care Staff   Current Financial resources: Medicare  Current community resources: ECF/Home Care, None  Current services prior to admission:  (interim. Aide 2hr once a week. nurse every other week)            Current DME: Walker - Rolling, Electric scooter            Type of Home Care services:  Aide Services, Nursing Services    ADLS  Prior functional level: Independent in ADLs/IADLs  Current functional level: Independent in ADLs/IADLs    Family can provide assistance at DC: Yes  Would you like Case Management to discuss the discharge plan with any other family members/significant others, and if so, who? No  Plans to Return to Present Housing: Yes  Other Identified Issues/Barriers to RETURNING to current housing: none noted  Potential Assistance needed at discharge: Home Care            Potential DME:  none noted  Patient  expects to discharge to: House  Plan for transportation at discharge:  sonBartolo    Financial    Active Insurance as of 4/3/2025       Primary Coverage       Payor Plan Insurance Group Employer/Plan Group    [facility] MEDICARE [facility] OFFICES ONLY [id number]       Payor Plan Address Payor Plan Phone Number Payor Plan Fax Number Effective Dates    [address] [address]   1/1/2025 - None Entered    [facility] [place] [address]         Subscriber Name Subscriber Birth Date Member ID       URBANO LOVING 1940 [id number]                     Does insurance require precert for SNF: Yes    Potential assistance Purchasing Medications: No  Meds-to-Beds request:        [id number] - [place], [place] - [address] - P [phone] - F [phone]  [address]  [place] [address]  Phone: [phone] Fax: [phone]      Notes:    Factors facilitating achievement of predicted outcomes: Family support, Motivated, Cooperative, Pleasant, Sense of humor, Good insight into deficits, Has needed Durable Medical Equipment at home, and Home is wheelchair accessible    Barriers to discharge: none noted    Additional Case Management Notes: pt planning on returning to current housing with previous Interim home care    The Plan for Transition of Care is related to the following treatment goals of Small bowel obstruction (HCC) [K56.609]  SBO (small bowel obstruction) (HCC) [K56.609]    IF APPLICABLE: The Patient and/or patient representative Urbano and her family were provided with a choice of provider and agrees with the discharge plan. Freedom of choice list with basic dialogue that supports the patient's individualized plan of care/goals and shares the quality data associated with the providers was provided to:     Patient Representative Name:       The Patient and/or Patient Representative Agree with the Discharge Plan? Yes    Eric Sullivan RN  Case Management Department

## 2025-04-04 NOTE — PROGRESS NOTES
Ok to give pt a little extra water per Yon MCCALL. Pt given 50 mL of water. Cup taken from patient when finished

## 2025-04-05 LAB
ANION GAP SERPL CALCULATED.3IONS-SCNC: 8 MMOL/L (ref 9–16)
BASOPHILS # BLD: <0.03 K/UL (ref 0–0.2)
BASOPHILS NFR BLD: 0 % (ref 0–2)
BUN SERPL-MCNC: 31 MG/DL (ref 8–23)
BUN/CREAT SERPL: 39 (ref 9–20)
CALCIUM SERPL-MCNC: 8.9 MG/DL (ref 8.6–10.4)
CHLORIDE SERPL-SCNC: 108 MMOL/L (ref 98–107)
CO2 SERPL-SCNC: 25 MMOL/L (ref 20–31)
CREAT SERPL-MCNC: 0.8 MG/DL (ref 0.6–0.9)
EOSINOPHIL # BLD: <0.03 K/UL (ref 0–0.44)
EOSINOPHILS RELATIVE PERCENT: 0 % (ref 1–4)
ERYTHROCYTE [DISTWIDTH] IN BLOOD BY AUTOMATED COUNT: 14.7 % (ref 11.8–14.4)
GFR, ESTIMATED: 72 ML/MIN/1.73M2
GLUCOSE BLD-MCNC: 104 MG/DL (ref 65–105)
GLUCOSE BLD-MCNC: 113 MG/DL (ref 65–105)
GLUCOSE BLD-MCNC: 83 MG/DL (ref 65–105)
GLUCOSE BLD-MCNC: 88 MG/DL (ref 65–105)
GLUCOSE SERPL-MCNC: 113 MG/DL (ref 74–99)
HCT VFR BLD AUTO: 30.5 % (ref 36.3–47.1)
HGB BLD-MCNC: 9.5 G/DL (ref 11.9–15.1)
IMM GRANULOCYTES # BLD AUTO: 0.05 K/UL (ref 0–0.3)
IMM GRANULOCYTES NFR BLD: 1 %
LYMPHOCYTES NFR BLD: 2.1 K/UL (ref 1.1–3.7)
LYMPHOCYTES RELATIVE PERCENT: 19 % (ref 24–43)
MCH RBC QN AUTO: 29.3 PG (ref 25.2–33.5)
MCHC RBC AUTO-ENTMCNC: 31.1 G/DL (ref 25.2–33.5)
MCV RBC AUTO: 94.1 FL (ref 82.6–102.9)
MONOCYTES NFR BLD: 1.27 K/UL (ref 0.1–1.2)
MONOCYTES NFR BLD: 12 % (ref 3–12)
NEUTROPHILS NFR BLD: 68 % (ref 36–65)
NEUTS SEG NFR BLD: 7.41 K/UL (ref 1.5–8.1)
NRBC BLD-RTO: 0 PER 100 WBC
PLATELET # BLD AUTO: 177 K/UL (ref 138–453)
PMV BLD AUTO: 10.5 FL (ref 8.1–13.5)
POTASSIUM SERPL-SCNC: 3.8 MMOL/L (ref 3.7–5.3)
RBC # BLD AUTO: 3.24 M/UL (ref 3.95–5.11)
RBC # BLD: ABNORMAL 10*6/UL
SODIUM SERPL-SCNC: 141 MMOL/L (ref 136–145)
WBC OTHER # BLD: 10.9 K/UL (ref 3.5–11.3)

## 2025-04-05 PROCEDURE — 99024 POSTOP FOLLOW-UP VISIT: CPT | Performed by: SURGERY

## 2025-04-05 PROCEDURE — 2580000003 HC RX 258

## 2025-04-05 PROCEDURE — 6370000000 HC RX 637 (ALT 250 FOR IP): Performed by: FAMILY MEDICINE

## 2025-04-05 PROCEDURE — 85025 COMPLETE CBC W/AUTO DIFF WBC: CPT

## 2025-04-05 PROCEDURE — 80048 BASIC METABOLIC PNL TOTAL CA: CPT

## 2025-04-05 PROCEDURE — 6370000000 HC RX 637 (ALT 250 FOR IP): Performed by: INTERNAL MEDICINE

## 2025-04-05 PROCEDURE — 82947 ASSAY GLUCOSE BLOOD QUANT: CPT

## 2025-04-05 PROCEDURE — 6370000000 HC RX 637 (ALT 250 FOR IP)

## 2025-04-05 PROCEDURE — 2060000000 HC ICU INTERMEDIATE R&B

## 2025-04-05 PROCEDURE — 94640 AIRWAY INHALATION TREATMENT: CPT

## 2025-04-05 PROCEDURE — 2500000003 HC RX 250 WO HCPCS: Performed by: SURGERY

## 2025-04-05 PROCEDURE — 2500000003 HC RX 250 WO HCPCS

## 2025-04-05 PROCEDURE — 2700000000 HC OXYGEN THERAPY PER DAY

## 2025-04-05 PROCEDURE — 6370000000 HC RX 637 (ALT 250 FOR IP): Performed by: SURGERY

## 2025-04-05 PROCEDURE — 36415 COLL VENOUS BLD VENIPUNCTURE: CPT

## 2025-04-05 PROCEDURE — 94761 N-INVAS EAR/PLS OXIMETRY MLT: CPT

## 2025-04-05 PROCEDURE — 99232 SBSQ HOSP IP/OBS MODERATE 35: CPT | Performed by: FAMILY MEDICINE

## 2025-04-05 PROCEDURE — 94669 MECHANICAL CHEST WALL OSCILL: CPT

## 2025-04-05 RX ORDER — ACETAMINOPHEN 325 MG/1
650 TABLET ORAL EVERY 4 HOURS PRN
Status: DISCONTINUED | OUTPATIENT
Start: 2025-04-05 | End: 2025-04-08 | Stop reason: HOSPADM

## 2025-04-05 RX ADMIN — APIXABAN 5 MG: 5 TABLET, FILM COATED ORAL at 20:51

## 2025-04-05 RX ADMIN — MICONAZOLE NITRATE: 20 POWDER TOPICAL at 08:56

## 2025-04-05 RX ADMIN — IPRATROPIUM BROMIDE AND ALBUTEROL SULFATE 1 DOSE: .5; 3 SOLUTION RESPIRATORY (INHALATION) at 19:57

## 2025-04-05 RX ADMIN — IPRATROPIUM BROMIDE AND ALBUTEROL SULFATE 1 DOSE: .5; 3 SOLUTION RESPIRATORY (INHALATION) at 12:12

## 2025-04-05 RX ADMIN — APIXABAN 5 MG: 5 TABLET, FILM COATED ORAL at 08:56

## 2025-04-05 RX ADMIN — SODIUM CHLORIDE: 0.9 INJECTION, SOLUTION INTRAVENOUS at 14:03

## 2025-04-05 RX ADMIN — OXYCODONE AND ACETAMINOPHEN 2 TABLET: 325; 5 TABLET ORAL at 09:22

## 2025-04-05 RX ADMIN — PHENOL 1 SPRAY: 1.5 LIQUID ORAL at 09:12

## 2025-04-05 RX ADMIN — ISOSORBIDE MONONITRATE 60 MG: 60 TABLET, EXTENDED RELEASE ORAL at 08:56

## 2025-04-05 RX ADMIN — MICONAZOLE NITRATE: 20 POWDER TOPICAL at 20:53

## 2025-04-05 RX ADMIN — IPRATROPIUM BROMIDE AND ALBUTEROL SULFATE 1 DOSE: .5; 3 SOLUTION RESPIRATORY (INHALATION) at 16:17

## 2025-04-05 RX ADMIN — SODIUM CHLORIDE, PRESERVATIVE FREE 10 ML: 5 INJECTION INTRAVENOUS at 08:58

## 2025-04-05 RX ADMIN — PHENOL 1 SPRAY: 1.5 LIQUID ORAL at 13:39

## 2025-04-05 RX ADMIN — METOPROLOL TARTRATE 25 MG: 25 TABLET, FILM COATED ORAL at 08:56

## 2025-04-05 RX ADMIN — METOPROLOL TARTRATE 25 MG: 25 TABLET, FILM COATED ORAL at 20:51

## 2025-04-05 RX ADMIN — Medication: at 08:57

## 2025-04-05 RX ADMIN — INSULIN GLARGINE 20 UNITS: 100 INJECTION, SOLUTION SUBCUTANEOUS at 08:57

## 2025-04-05 RX ADMIN — IPRATROPIUM BROMIDE AND ALBUTEROL SULFATE 1 DOSE: .5; 3 SOLUTION RESPIRATORY (INHALATION) at 08:57

## 2025-04-05 RX ADMIN — SODIUM CHLORIDE: 0.9 INJECTION, SOLUTION INTRAVENOUS at 04:30

## 2025-04-05 ASSESSMENT — PAIN SCALES - GENERAL
PAINLEVEL_OUTOF10: 0
PAINLEVEL_OUTOF10: 2

## 2025-04-05 ASSESSMENT — PAIN SCALES - WONG BAKER
WONGBAKER_NUMERICALRESPONSE: NO HURT
WONGBAKER_NUMERICALRESPONSE: NO HURT

## 2025-04-05 NOTE — PLAN OF CARE
Problem: Chronic Conditions and Co-morbidities  Goal: Patient's chronic conditions and co-morbidity symptoms are monitored and maintained or improved  4/4/2025 2326 by Remedios Murillo RN  Outcome: Progressing  4/4/2025 2326 by Remedios Murillo, RN  Outcome: Progressing     Problem: Discharge Planning  Goal: Discharge to home or other facility with appropriate resources  Outcome: Progressing     Problem: Safety - Adult  Goal: Free from fall injury  Outcome: Progressing     Problem: ABCDS Injury Assessment  Goal: Absence of physical injury  Outcome: Progressing     Problem: Skin/Tissue Integrity  Goal: Skin integrity remains intact  Description: 1.  Monitor for areas of redness and/or skin breakdown  2.  Assess vascular access sites hourly  3.  Every 4-6 hours minimum:  Change oxygen saturation probe site  4.  Every 4-6 hours:  If on nasal continuous positive airway pressure, respiratory therapy assess nares and determine need for appliance change or resting period  Outcome: Progressing     Problem: Pain  Goal: Verbalizes/displays adequate comfort level or baseline comfort level  Outcome: Progressing     Problem: Respiratory - Adult  Goal: Achieves optimal ventilation and oxygenation  Outcome: Progressing     Problem: Cardiovascular - Adult  Goal: Absence of cardiac dysrhythmias or at baseline  Outcome: Progressing     Problem: Skin/Tissue Integrity - Adult  Goal: Incisions, wounds, or drain sites healing without S/S of infection  Outcome: Progressing     Problem: Musculoskeletal - Adult  Goal: Return mobility to safest level of function  Outcome: Progressing     Problem: Gastrointestinal - Adult  Goal: Maintains or returns to baseline bowel function  Outcome: Progressing     Problem: Genitourinary - Adult  Goal: Absence of urinary retention  Outcome: Progressing

## 2025-04-05 NOTE — PROGRESS NOTES
Hospitalist Progress Note  4/5/2025 1:36 PM  Subjective:   Admit Date: 4/3/2025  PCP: Lynette Han MD     DNR-CCA      C/c:No chief complaint on file.        Interval History: pt doing slightly better. Not moving much bowel  hypoactive, NG in place to low suction    Diet: Diet NPO Exceptions are: Sips of Water with Meds                                ip days:2  Medications:   Scheduled Meds:   miconazole   Topical BID    ammonium lactate   Topical Daily    insulin glargine  20 Units SubCUTAneous Daily    insulin lispro  0-16 Units SubCUTAneous Q6H    apixaban  5 mg Oral BID    sodium chloride flush  10 mL IntraVENous 2 times per day    ipratropium 0.5 mg-albuterol 2.5 mg  1 Dose Inhalation Q4H WA RT    isosorbide mononitrate  60 mg Oral Daily    metoprolol tartrate  25 mg Oral BID    sodium chloride flush  5-40 mL IntraVENous 2 times per day     Continuous Infusions:   sodium chloride      dextrose      sodium chloride      sodium chloride 100 mL/hr at 04/05/25 0643     PRN Meds:.acetaminophen, phenol, oxyCODONE-acetaminophen **OR** oxyCODONE-acetaminophen, sodium chloride flush, sodium chloride, ondansetron, polyethylene glycol, glucose, dextrose bolus **OR** dextrose bolus, glucagon (rDNA), dextrose, ipratropium 0.5 mg-albuterol 2.5 mg, morphine **OR** morphine, sodium chloride flush, sodium chloride     CBC:   Recent Labs     04/03/25  1530 04/04/25  0508 04/04/25  1151 04/04/25  1900 04/05/25  0536   WBC 11.5* 14.9*  --   --  10.9   HGB 13.4 11.1* 10.6* 10.4* 9.5*    212  --   --  177     BMP:    Recent Labs     04/03/25  1630 04/04/25  0508 04/05/25  0536    139 141   K 4.1 4.2 3.8    103 108*   CO2 23 25 25   BUN 30* 31* 31*   CREATININE 0.9 0.9 0.8   GLUCOSE 184* 199* 113*     Hepatic: No results for input(s): \"AST\", \"ALT\", \"BILITOT\", \"ALKPHOS\" in the last 72 hours.    Invalid input(s): \"ALB\"  Troponin: No results for input(s): \"TROPONINI\" in the last 72 hours.  BNP: No results  discharge: [] Home                                                                         [] Home with Home Health                                                                         [] Skilled Nursing Facility                                                                         [] [facility]      Patient is admitted as inpatient status because of co-morbidities listed above, severity of signs and symptoms as outlined, requirement for current medical therapies and most importantly because of direct risk to patient if care not provided in a hospital setting.          Mickey Garsia MD, MD  [facility] Hospitalist

## 2025-04-05 NOTE — PLAN OF CARE
oxygenation  4/5/2025 1049 by Halima Mills RN  Outcome: Progressing  4/4/2025 2326 by Remedios Murillo RN  Outcome: Progressing     Problem: Cardiovascular - Adult  Goal: Absence of cardiac dysrhythmias or at baseline  4/5/2025 1049 by Halima Mills RN  Outcome: Progressing  4/4/2025 2326 by Remedios Murillo RN  Outcome: Progressing     Problem: Skin/Tissue Integrity - Adult  Goal: Incisions, wounds, or drain sites healing without S/S of infection  4/5/2025 1049 by Halima Mills RN  Outcome: Progressing  4/4/2025 2326 by Remedios Murillo RN  Outcome: Progressing     Problem: Musculoskeletal - Adult  Goal: Return mobility to safest level of function  4/5/2025 1049 by Halima Mills RN  Outcome: Progressing  4/4/2025 2326 by Remedios Murillo RN  Outcome: Progressing     Problem: Gastrointestinal - Adult  Goal: Maintains or returns to baseline bowel function  4/5/2025 1049 by Halima Mills RN  Outcome: Progressing  4/4/2025 2326 by Remedios Murillo RN  Outcome: Progressing     Problem: Genitourinary - Adult  Goal: Absence of urinary retention  4/4/2025 2326 by Remedios Murillo RN  Outcome: Progressing

## 2025-04-06 LAB
ANION GAP SERPL CALCULATED.3IONS-SCNC: 9 MMOL/L (ref 9–16)
BASOPHILS # BLD: 0.03 K/UL (ref 0–0.2)
BASOPHILS NFR BLD: 0 % (ref 0–2)
BUN SERPL-MCNC: 24 MG/DL (ref 8–23)
BUN/CREAT SERPL: 34 (ref 9–20)
CALCIUM SERPL-MCNC: 8.7 MG/DL (ref 8.6–10.4)
CHLORIDE SERPL-SCNC: 110 MMOL/L (ref 98–107)
CO2 SERPL-SCNC: 24 MMOL/L (ref 20–31)
CREAT SERPL-MCNC: 0.7 MG/DL (ref 0.6–0.9)
EOSINOPHIL # BLD: 0.03 K/UL (ref 0–0.44)
EOSINOPHILS RELATIVE PERCENT: 0 % (ref 1–4)
ERYTHROCYTE [DISTWIDTH] IN BLOOD BY AUTOMATED COUNT: 14.8 % (ref 11.8–14.4)
GFR, ESTIMATED: 84 ML/MIN/1.73M2
GLUCOSE BLD-MCNC: 134 MG/DL (ref 65–105)
GLUCOSE BLD-MCNC: 78 MG/DL (ref 65–105)
GLUCOSE BLD-MCNC: 82 MG/DL (ref 65–105)
GLUCOSE BLD-MCNC: 88 MG/DL (ref 65–105)
GLUCOSE BLD-MCNC: 92 MG/DL (ref 65–105)
GLUCOSE SERPL-MCNC: 100 MG/DL (ref 74–99)
HCT VFR BLD AUTO: 28.8 % (ref 36.3–47.1)
HGB BLD-MCNC: 8.9 G/DL (ref 11.9–15.1)
IMM GRANULOCYTES # BLD AUTO: 0.07 K/UL (ref 0–0.3)
IMM GRANULOCYTES NFR BLD: 1 %
LYMPHOCYTES NFR BLD: 2.06 K/UL (ref 1.1–3.7)
LYMPHOCYTES RELATIVE PERCENT: 22 % (ref 24–43)
MCH RBC QN AUTO: 29.6 PG (ref 25.2–33.5)
MCHC RBC AUTO-ENTMCNC: 30.9 G/DL (ref 25.2–33.5)
MCV RBC AUTO: 95.7 FL (ref 82.6–102.9)
MONOCYTES NFR BLD: 0.91 K/UL (ref 0.1–1.2)
MONOCYTES NFR BLD: 10 % (ref 3–12)
NEUTROPHILS NFR BLD: 67 % (ref 36–65)
NEUTS SEG NFR BLD: 6.22 K/UL (ref 1.5–8.1)
NRBC BLD-RTO: 0 PER 100 WBC
PLATELET # BLD AUTO: 180 K/UL (ref 138–453)
PMV BLD AUTO: 10.3 FL (ref 8.1–13.5)
POTASSIUM SERPL-SCNC: 3.9 MMOL/L (ref 3.7–5.3)
RBC # BLD AUTO: 3.01 M/UL (ref 3.95–5.11)
RBC # BLD: ABNORMAL 10*6/UL
SODIUM SERPL-SCNC: 143 MMOL/L (ref 136–145)
WBC OTHER # BLD: 9.3 K/UL (ref 3.5–11.3)

## 2025-04-06 PROCEDURE — 6370000000 HC RX 637 (ALT 250 FOR IP): Performed by: SURGERY

## 2025-04-06 PROCEDURE — 2060000000 HC ICU INTERMEDIATE R&B

## 2025-04-06 PROCEDURE — 6370000000 HC RX 637 (ALT 250 FOR IP): Performed by: INTERNAL MEDICINE

## 2025-04-06 PROCEDURE — 36415 COLL VENOUS BLD VENIPUNCTURE: CPT

## 2025-04-06 PROCEDURE — 94669 MECHANICAL CHEST WALL OSCILL: CPT

## 2025-04-06 PROCEDURE — 94761 N-INVAS EAR/PLS OXIMETRY MLT: CPT

## 2025-04-06 PROCEDURE — 99024 POSTOP FOLLOW-UP VISIT: CPT | Performed by: SURGERY

## 2025-04-06 PROCEDURE — 94640 AIRWAY INHALATION TREATMENT: CPT

## 2025-04-06 PROCEDURE — 2700000000 HC OXYGEN THERAPY PER DAY

## 2025-04-06 PROCEDURE — 2580000003 HC RX 258

## 2025-04-06 PROCEDURE — 6370000000 HC RX 637 (ALT 250 FOR IP)

## 2025-04-06 PROCEDURE — 85025 COMPLETE CBC W/AUTO DIFF WBC: CPT

## 2025-04-06 PROCEDURE — 80048 BASIC METABOLIC PNL TOTAL CA: CPT

## 2025-04-06 PROCEDURE — 82947 ASSAY GLUCOSE BLOOD QUANT: CPT

## 2025-04-06 RX ORDER — INSULIN LISPRO 100 [IU]/ML
0-16 INJECTION, SOLUTION INTRAVENOUS; SUBCUTANEOUS
Status: DISCONTINUED | OUTPATIENT
Start: 2025-04-06 | End: 2025-04-08 | Stop reason: HOSPADM

## 2025-04-06 RX ADMIN — SODIUM CHLORIDE: 0.9 INJECTION, SOLUTION INTRAVENOUS at 20:08

## 2025-04-06 RX ADMIN — SODIUM CHLORIDE: 0.9 INJECTION, SOLUTION INTRAVENOUS at 09:42

## 2025-04-06 RX ADMIN — IPRATROPIUM BROMIDE AND ALBUTEROL SULFATE 1 DOSE: .5; 3 SOLUTION RESPIRATORY (INHALATION) at 16:15

## 2025-04-06 RX ADMIN — IPRATROPIUM BROMIDE AND ALBUTEROL SULFATE 1 DOSE: .5; 3 SOLUTION RESPIRATORY (INHALATION) at 09:33

## 2025-04-06 RX ADMIN — METOPROLOL TARTRATE 25 MG: 25 TABLET, FILM COATED ORAL at 09:40

## 2025-04-06 RX ADMIN — SODIUM CHLORIDE: 0.9 INJECTION, SOLUTION INTRAVENOUS at 00:05

## 2025-04-06 RX ADMIN — APIXABAN 5 MG: 5 TABLET, FILM COATED ORAL at 09:40

## 2025-04-06 RX ADMIN — OXYCODONE AND ACETAMINOPHEN 1 TABLET: 325; 5 TABLET ORAL at 22:46

## 2025-04-06 RX ADMIN — MICONAZOLE NITRATE: 20 POWDER TOPICAL at 09:39

## 2025-04-06 RX ADMIN — ISOSORBIDE MONONITRATE 60 MG: 60 TABLET, EXTENDED RELEASE ORAL at 09:41

## 2025-04-06 RX ADMIN — IPRATROPIUM BROMIDE AND ALBUTEROL SULFATE 1 DOSE: .5; 3 SOLUTION RESPIRATORY (INHALATION) at 20:24

## 2025-04-06 RX ADMIN — Medication: at 09:39

## 2025-04-06 RX ADMIN — INSULIN GLARGINE 20 UNITS: 100 INJECTION, SOLUTION SUBCUTANEOUS at 09:40

## 2025-04-06 RX ADMIN — APIXABAN 5 MG: 5 TABLET, FILM COATED ORAL at 21:28

## 2025-04-06 RX ADMIN — MICONAZOLE NITRATE: 20 POWDER TOPICAL at 21:30

## 2025-04-06 RX ADMIN — METOPROLOL TARTRATE 25 MG: 25 TABLET, FILM COATED ORAL at 21:28

## 2025-04-06 RX ADMIN — IPRATROPIUM BROMIDE AND ALBUTEROL SULFATE 1 DOSE: .5; 3 SOLUTION RESPIRATORY (INHALATION) at 12:33

## 2025-04-06 ASSESSMENT — PAIN SCALES - WONG BAKER: WONGBAKER_NUMERICALRESPONSE: NO HURT

## 2025-04-06 ASSESSMENT — PAIN SCALES - GENERAL: PAINLEVEL_OUTOF10: 4

## 2025-04-06 ASSESSMENT — PAIN DESCRIPTION - ORIENTATION: ORIENTATION: LEFT

## 2025-04-06 ASSESSMENT — PAIN DESCRIPTION - LOCATION: LOCATION: ABDOMEN

## 2025-04-06 ASSESSMENT — PAIN DESCRIPTION - DESCRIPTORS: DESCRIPTORS: DISCOMFORT

## 2025-04-06 NOTE — PROGRESS NOTES
Patient needs a refill on his Plavix 75 mg and coreg 25 mg    Physical Therapy    Patient adamantly declined therapy services stating she does not need therapy, stating, \"I had to call my son last time you tried to do therapy\"  Previous attempt on 4-4-25 with decline of service.  New orders were placed so reattempt was made  Pt did not want to hear of benefits  strong decline and decline to nurse as well . Patent to alert nursing staff if wishes to resume P.T.     Poornima Jett, PT

## 2025-04-06 NOTE — PLAN OF CARE
Problem: Chronic Conditions and Co-morbidities  Goal: Patient's chronic conditions and co-morbidity symptoms are monitored and maintained or improved  4/6/2025 1048 by Halima Mills RN  Outcome: Progressing  4/6/2025 0153 by Remedios Murillo RN  Outcome: Progressing     Problem: Discharge Planning  Goal: Discharge to home or other facility with appropriate resources  4/6/2025 1048 by Halima Mills RN  Outcome: Progressing  Flowsheets (Taken 4/6/2025 0938)  Discharge to home or other facility with appropriate resources: Identify barriers to discharge with patient and caregiver  4/6/2025 0153 by Remedios Murillo RN  Outcome: Progressing     Problem: Safety - Adult  Goal: Free from fall injury  4/6/2025 1048 by Halima Mills RN  Outcome: Progressing  4/6/2025 0153 by Remedios Mruillo RN  Outcome: Progressing     Problem: ABCDS Injury Assessment  Goal: Absence of physical injury  4/6/2025 1048 by Halima Mills RN  Outcome: Progressing  4/6/2025 0153 by Remedios Murillo RN  Outcome: Progressing     Problem: Skin/Tissue Integrity  Goal: Skin integrity remains intact  Description: 1.  Monitor for areas of redness and/or skin breakdown  2.  Assess vascular access sites hourly  3.  Every 4-6 hours minimum:  Change oxygen saturation probe site  4.  Every 4-6 hours:  If on nasal continuous positive airway pressure, respiratory therapy assess nares and determine need for appliance change or resting period  4/6/2025 1048 by Halima Mills RN  Outcome: Progressing  Flowsheets (Taken 4/6/2025 0938)  Skin Integrity Remains Intact: Monitor skin under medical devices  4/6/2025 0153 by Remedios Murillo RN  Outcome: Progressing     Problem: Pain  Goal: Verbalizes/displays adequate comfort level or baseline comfort level  4/6/2025 1048 by Halima Mills RN  Outcome: Progressing  4/6/2025 0153 by Remedios Murillo RN  Outcome: Progressing     Problem: Respiratory - Adult  Goal: Achieves

## 2025-04-06 NOTE — PROGRESS NOTES
General Surgery Progress Note    No chief complaint on file.      POD #:  3      History:  The patient is 84 y.o. female.     Patient tolerating ice chips and sips not having much abdominal pain.  Just a little bit of flatus started.  No nausea.    I reviewed the patient's Medications and Allergies.    Physical Exam:  Vitals:    04/06/25 0827   BP: (!) 131/49   Pulse: 78   Resp: 18   Temp: 98.6 °F (37 °C)   SpO2: 98%   Abdomen: Soft or bowel sounds today.  Wound appears to be healing okay.  No sign of cellulitis.  Minimal tenderness.  No distention.  Bowel function appears to be slowly returning.  We will remove the NG tube today        Impression/Plan:  Patient Active Problem List   Diagnosis    Discoloration of skin of toe    Arterial insufficiency of lower extremity    Morbid obesity with BMI of 50.0-59.9, adult    Type 2 diabetes mellitus    Diabetic polyneuropathy associated with type 2 diabetes mellitus    SBO (small bowel obstruction) (HCC)    Small bowel obstruction (HCC)    Heart failure, left, with LVEF >40% (HCC)    Bilateral hearing loss    Chronic primary gouty arthritis    Morbid obesity    Essential hypertension    DERRICK (iron deficiency anemia)    Lymphedema of both lower extremities    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   Bowel function appears to be slowly returning.  We will remove the NG tube today and start clear liquids.      Electronically by BOOKER MCGINNIS MD  on 4/6/2025 at 9:11 AM

## 2025-04-06 NOTE — PLAN OF CARE
Problem: Chronic Conditions and Co-morbidities  Goal: Patient's chronic conditions and co-morbidity symptoms are monitored and maintained or improved  Outcome: Progressing     Problem: Discharge Planning  Goal: Discharge to home or other facility with appropriate resources  Outcome: Progressing     Problem: Safety - Adult  Goal: Free from fall injury  Outcome: Progressing     Problem: ABCDS Injury Assessment  Goal: Absence of physical injury  Outcome: Progressing     Problem: Skin/Tissue Integrity  Goal: Skin integrity remains intact  Description: 1.  Monitor for areas of redness and/or skin breakdown  2.  Assess vascular access sites hourly  3.  Every 4-6 hours minimum:  Change oxygen saturation probe site  4.  Every 4-6 hours:  If on nasal continuous positive airway pressure, respiratory therapy assess nares and determine need for appliance change or resting period  Outcome: Progressing     Problem: Pain  Goal: Verbalizes/displays adequate comfort level or baseline comfort level  Outcome: Progressing     Problem: Respiratory - Adult  Goal: Achieves optimal ventilation and oxygenation  Outcome: Progressing     Problem: Cardiovascular - Adult  Goal: Absence of cardiac dysrhythmias or at baseline  Outcome: Progressing     Problem: Skin/Tissue Integrity - Adult  Goal: Incisions, wounds, or drain sites healing without S/S of infection  Outcome: Progressing     Problem: Musculoskeletal - Adult  Goal: Return mobility to safest level of function  Outcome: Progressing     Problem: Gastrointestinal - Adult  Goal: Maintains or returns to baseline bowel function  Outcome: Progressing     Problem: Genitourinary - Adult  Goal: Absence of urinary retention  Outcome: Progressing

## 2025-04-06 NOTE — PROGRESS NOTES
Hospitalist Progress Note  4/6/2025 3:11 PM  Subjective:   Admit Date: 4/3/2025  PCP: Lynette Han MD     DNR-CCA      C/c:No chief complaint on file.        Interval History: ng out/ pt doing slightly better.on clears    Diet: ADULT DIET; Clear Liquid                                ip days:3  Medications:   Scheduled Meds:   miconazole   Topical BID    ammonium lactate   Topical Daily    insulin glargine  20 Units SubCUTAneous Daily    insulin lispro  0-16 Units SubCUTAneous Q6H    apixaban  5 mg Oral BID    sodium chloride flush  10 mL IntraVENous 2 times per day    ipratropium 0.5 mg-albuterol 2.5 mg  1 Dose Inhalation Q4H WA RT    isosorbide mononitrate  60 mg Oral Daily    metoprolol tartrate  25 mg Oral BID    sodium chloride flush  5-40 mL IntraVENous 2 times per day     Continuous Infusions:   sodium chloride      dextrose      sodium chloride      sodium chloride 100 mL/hr at 04/06/25 1327     PRN Meds:.acetaminophen, phenol, oxyCODONE-acetaminophen **OR** oxyCODONE-acetaminophen, sodium chloride flush, sodium chloride, ondansetron, polyethylene glycol, glucose, dextrose bolus **OR** dextrose bolus, glucagon (rDNA), dextrose, ipratropium 0.5 mg-albuterol 2.5 mg, morphine **OR** morphine, sodium chloride flush, sodium chloride     CBC:   Recent Labs     04/04/25  0508 04/04/25  1151 04/04/25  1900 04/05/25  0536 04/06/25  0547   WBC 14.9*  --   --  10.9 9.3   HGB 11.1*   < > 10.4* 9.5* 8.9*     --   --  177 180    < > = values in this interval not displayed.     BMP:    Recent Labs     04/04/25  0508 04/05/25  0536 04/06/25  0547    141 143   K 4.2 3.8 3.9    108* 110*   CO2 25 25 24   BUN 31* 31* 24*   CREATININE 0.9 0.8 0.7   GLUCOSE 199* 113* 100*     Hepatic: No results for input(s): \"AST\", \"ALT\", \"BILITOT\", \"ALKPHOS\" in the last 72 hours.    Invalid input(s): \"ALB\"  Troponin: No results for input(s): \"TROPONINI\" in the last 72 hours.  BNP: No results for input(s): \"BNP\" in                                                                    [] Home with Home Health                                                                         [] Skilled Nursing Facility                                                                         [] [facility]      Patient is admitted as inpatient status because of co-morbidities listed above, severity of signs and symptoms as outlined, requirement for current medical therapies and most importantly because of direct risk to patient if care not provided in a hospital setting.          Mickey Garsia MD, MD  [facility] Hospitalist

## 2025-04-06 NOTE — PROGRESS NOTES
General Surgery Progress Note    No chief complaint on file.      POD #:  2      History:  The patient is 84 y.o. female.     Feeling better  NGT came out last night, but reinserted.  No flatus or BM    I reviewed the patient's Medications and Allergies.    Physical Exam:  Vitals:    04/06/25 0827   BP: (!) 131/49   Pulse: 78   Resp: 18   Temp: 98.6 °F (37 °C)   SpO2: 98%   Abdomen:  soft, few BS, expected incisional tenderness, no R or G,         Impression/Plan:  Patient Active Problem List   Diagnosis    Discoloration of skin of toe    Arterial insufficiency of lower extremity    Morbid obesity with BMI of 50.0-59.9, adult    Type 2 diabetes mellitus    Diabetic polyneuropathy associated with type 2 diabetes mellitus    SBO (small bowel obstruction) (HCC)    Small bowel obstruction (HCC)    Heart failure, left, with LVEF >40% (HCC)    Bilateral hearing loss    Chronic primary gouty arthritis    Morbid obesity    Essential hypertension    DERRICK (iron deficiency anemia)    Lymphedema of both lower extremities    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   Patient appears to be doing better on postop day 2.  Starting to get bowel sounds.  No flatus or bowel movement yet.  Since NG tube was really inserted last night we will keep it in today and consider removing it tomorrow.  Otherwise we will maintain current treatment    Electronically by BOOKER MCGINNIS MD  on 4/6/2025 at 9:08 AM

## 2025-04-06 NOTE — PROGRESS NOTES
Pt refused therapy this morning. RN went in and educated Pt on benefits and risks of working with therapy after a surgical procedure. Patient still refused therapy at this time.

## 2025-04-07 LAB
ANION GAP SERPL CALCULATED.3IONS-SCNC: 6 MMOL/L (ref 9–16)
BASOPHILS # BLD: 0.04 K/UL (ref 0–0.2)
BASOPHILS NFR BLD: 1 % (ref 0–2)
BUN SERPL-MCNC: 20 MG/DL (ref 8–23)
BUN/CREAT SERPL: 29 (ref 9–20)
CALCIUM SERPL-MCNC: 8.7 MG/DL (ref 8.6–10.4)
CHLORIDE SERPL-SCNC: 109 MMOL/L (ref 98–107)
CO2 SERPL-SCNC: 25 MMOL/L (ref 20–31)
CREAT SERPL-MCNC: 0.7 MG/DL (ref 0.6–0.9)
EOSINOPHIL # BLD: 0.13 K/UL (ref 0–0.44)
EOSINOPHILS RELATIVE PERCENT: 2 % (ref 1–4)
ERYTHROCYTE [DISTWIDTH] IN BLOOD BY AUTOMATED COUNT: 14.6 % (ref 11.8–14.4)
GFR, ESTIMATED: 84 ML/MIN/1.73M2
GLUCOSE BLD-MCNC: 136 MG/DL (ref 65–105)
GLUCOSE BLD-MCNC: 178 MG/DL (ref 65–105)
GLUCOSE BLD-MCNC: 183 MG/DL (ref 65–105)
GLUCOSE SERPL-MCNC: 100 MG/DL (ref 74–99)
HCT VFR BLD AUTO: 29.4 % (ref 36.3–47.1)
HGB BLD-MCNC: 8.9 G/DL (ref 11.9–15.1)
IMM GRANULOCYTES # BLD AUTO: 0.1 K/UL (ref 0–0.3)
IMM GRANULOCYTES NFR BLD: 1 %
LYMPHOCYTES NFR BLD: 2.15 K/UL (ref 1.1–3.7)
LYMPHOCYTES RELATIVE PERCENT: 29 % (ref 24–43)
MCH RBC QN AUTO: 29 PG (ref 25.2–33.5)
MCHC RBC AUTO-ENTMCNC: 30.3 G/DL (ref 25.2–33.5)
MCV RBC AUTO: 95.8 FL (ref 82.6–102.9)
MONOCYTES NFR BLD: 0.83 K/UL (ref 0.1–1.2)
MONOCYTES NFR BLD: 11 % (ref 3–12)
NEUTROPHILS NFR BLD: 56 % (ref 36–65)
NEUTS SEG NFR BLD: 4.15 K/UL (ref 1.5–8.1)
NRBC BLD-RTO: 0 PER 100 WBC
PLATELET # BLD AUTO: 195 K/UL (ref 138–453)
PMV BLD AUTO: 10.2 FL (ref 8.1–13.5)
POTASSIUM SERPL-SCNC: 4.7 MMOL/L (ref 3.7–5.3)
RBC # BLD AUTO: 3.07 M/UL (ref 3.95–5.11)
RBC # BLD: ABNORMAL 10*6/UL
SODIUM SERPL-SCNC: 140 MMOL/L (ref 136–145)
WBC OTHER # BLD: 7.4 K/UL (ref 3.5–11.3)

## 2025-04-07 PROCEDURE — 94640 AIRWAY INHALATION TREATMENT: CPT

## 2025-04-07 PROCEDURE — 82947 ASSAY GLUCOSE BLOOD QUANT: CPT

## 2025-04-07 PROCEDURE — 99231 SBSQ HOSP IP/OBS SF/LOW 25: CPT | Performed by: FAMILY MEDICINE

## 2025-04-07 PROCEDURE — 85025 COMPLETE CBC W/AUTO DIFF WBC: CPT

## 2025-04-07 PROCEDURE — 6370000000 HC RX 637 (ALT 250 FOR IP)

## 2025-04-07 PROCEDURE — 36415 COLL VENOUS BLD VENIPUNCTURE: CPT

## 2025-04-07 PROCEDURE — 51798 US URINE CAPACITY MEASURE: CPT

## 2025-04-07 PROCEDURE — 6370000000 HC RX 637 (ALT 250 FOR IP): Performed by: SURGERY

## 2025-04-07 PROCEDURE — 80048 BASIC METABOLIC PNL TOTAL CA: CPT

## 2025-04-07 PROCEDURE — 94760 N-INVAS EAR/PLS OXIMETRY 1: CPT

## 2025-04-07 PROCEDURE — 2500000003 HC RX 250 WO HCPCS: Performed by: SURGERY

## 2025-04-07 PROCEDURE — 2700000000 HC OXYGEN THERAPY PER DAY

## 2025-04-07 PROCEDURE — 99024 POSTOP FOLLOW-UP VISIT: CPT | Performed by: PHYSICIAN ASSISTANT

## 2025-04-07 PROCEDURE — 2060000000 HC ICU INTERMEDIATE R&B

## 2025-04-07 PROCEDURE — 6370000000 HC RX 637 (ALT 250 FOR IP): Performed by: FAMILY MEDICINE

## 2025-04-07 PROCEDURE — 6370000000 HC RX 637 (ALT 250 FOR IP): Performed by: INTERNAL MEDICINE

## 2025-04-07 PROCEDURE — 2580000003 HC RX 258

## 2025-04-07 PROCEDURE — 94669 MECHANICAL CHEST WALL OSCILL: CPT

## 2025-04-07 PROCEDURE — 97165 OT EVAL LOW COMPLEX 30 MIN: CPT | Performed by: OCCUPATIONAL THERAPIST

## 2025-04-07 RX ADMIN — METOPROLOL TARTRATE 25 MG: 25 TABLET, FILM COATED ORAL at 09:35

## 2025-04-07 RX ADMIN — SODIUM CHLORIDE: 0.9 INJECTION, SOLUTION INTRAVENOUS at 07:00

## 2025-04-07 RX ADMIN — IPRATROPIUM BROMIDE AND ALBUTEROL SULFATE 1 DOSE: .5; 3 SOLUTION RESPIRATORY (INHALATION) at 20:11

## 2025-04-07 RX ADMIN — INSULIN LISPRO 4 UNITS: 100 INJECTION, SOLUTION INTRAVENOUS; SUBCUTANEOUS at 22:04

## 2025-04-07 RX ADMIN — OXYCODONE AND ACETAMINOPHEN 1 TABLET: 325; 5 TABLET ORAL at 22:07

## 2025-04-07 RX ADMIN — IPRATROPIUM BROMIDE AND ALBUTEROL SULFATE 1 DOSE: .5; 3 SOLUTION RESPIRATORY (INHALATION) at 16:26

## 2025-04-07 RX ADMIN — APIXABAN 5 MG: 5 TABLET, FILM COATED ORAL at 22:04

## 2025-04-07 RX ADMIN — MICONAZOLE NITRATE: 20 POWDER TOPICAL at 22:04

## 2025-04-07 RX ADMIN — INSULIN GLARGINE 20 UNITS: 100 INJECTION, SOLUTION SUBCUTANEOUS at 09:35

## 2025-04-07 RX ADMIN — MICONAZOLE NITRATE: 20 POWDER TOPICAL at 09:39

## 2025-04-07 RX ADMIN — ISOSORBIDE MONONITRATE 60 MG: 60 TABLET, EXTENDED RELEASE ORAL at 09:35

## 2025-04-07 RX ADMIN — SODIUM CHLORIDE, PRESERVATIVE FREE 10 ML: 5 INJECTION INTRAVENOUS at 09:40

## 2025-04-07 RX ADMIN — IPRATROPIUM BROMIDE AND ALBUTEROL SULFATE 1 DOSE: .5; 3 SOLUTION RESPIRATORY (INHALATION) at 12:15

## 2025-04-07 RX ADMIN — Medication: at 09:37

## 2025-04-07 RX ADMIN — METOPROLOL TARTRATE 25 MG: 25 TABLET, FILM COATED ORAL at 22:04

## 2025-04-07 RX ADMIN — IPRATROPIUM BROMIDE AND ALBUTEROL SULFATE 1 DOSE: .5; 3 SOLUTION RESPIRATORY (INHALATION) at 08:14

## 2025-04-07 RX ADMIN — APIXABAN 5 MG: 5 TABLET, FILM COATED ORAL at 09:35

## 2025-04-07 ASSESSMENT — PAIN SCALES - WONG BAKER: WONGBAKER_NUMERICALRESPONSE: NO HURT

## 2025-04-07 ASSESSMENT — PAIN - FUNCTIONAL ASSESSMENT: PAIN_FUNCTIONAL_ASSESSMENT: ACTIVITIES ARE NOT PREVENTED

## 2025-04-07 ASSESSMENT — PAIN SCALES - GENERAL: PAINLEVEL_OUTOF10: 4

## 2025-04-07 ASSESSMENT — PAIN DESCRIPTION - DESCRIPTORS: DESCRIPTORS: ACHING;SORE

## 2025-04-07 ASSESSMENT — PAIN DESCRIPTION - LOCATION: LOCATION: ABDOMEN

## 2025-04-07 ASSESSMENT — PAIN DESCRIPTION - ORIENTATION: ORIENTATION: MID;RIGHT;LEFT;UPPER

## 2025-04-07 NOTE — PROGRESS NOTES
Occupational Therapy  Facility/Department: [facility]  PROGRESSIVE CARE  Occupational Therapy Initial Assessment    Name: Urbano Levy  : 1940  MRN: [id number]  Date of Service: 2025    Discharge Recommendations:  Long Term Care with OT     Patient Diagnosis(es): There were no encounter diagnoses.  Past Medical History:  has a past medical history of Arthritis, Chest pain, Diabetes (HCC), Early dry stage nonexudative age-related macular degeneration of both eyes, Family history of melanoma, Gait difficulty, Glaucoma suspect of both eyes, H/O colonoscopy, Hypertension, Posterior vitreous detachment of both eyes, Primary osteoarthritis of both knees, S/P coronary artery stent placement, Seborrheic dermatitis, and Trichiasis of left lower eyelid.  Past Surgical History:  has a past surgical history that includes Femur Surgery (Right); Gallbladder surgery; Tonsillectomy and adenoidectomy; Hysterectomy; Colonoscopy; and laparoscopy (N/A, 4/3/2025).    Treatment Diagnosis: general weakness      Assessment  Performance deficits / Impairments: Decreased functional mobility ;Decreased ADL status;Decreased endurance;Decreased balance;Decreased high-level IADLs  Treatment Diagnosis: general weakness  Prognosis: Fair  Decision Making: Low Complexity        Plan  Occupational Therapy Plan  Times Per Week: 1-2  Current Treatment Recommendations: Self-Care / ADL, Patient/Caregiver education & training, Equipment evaluation, education, & procurement, Functional mobility training      Subjective  General  Chart Reviewed: Yes  Patient assessed for rehabilitation services?: Yes  Family / Caregiver Present: No  Referring Practitioner: PARISH Prince  Diagnosis: SBO     Social/Functional History  Social/Functional History  Lives With: Alone  Type of Home: House  Home Layout: One level  Home Access: Ramped entrance  Bathroom Shower/Tub: Walk-in shower  Bathroom Toilet: Handicap height  Bathroom Equipment: Grab bars in shower, Shower chair,  Concurrent Group Co-treatment   Time In 0944         Time Out 1000         Minutes 16         Timed Code Treatment Minutes: 0 Minutes       BRANDON MARIO OTR, OT

## 2025-04-07 NOTE — PROGRESS NOTES
General Surgery  Daily Progress Note  Pt Name: Urbano Levy  Medical Record Number: [id number]  Date of Birth 1940   Today's Date: 4/7/2025      SUBJECTIVE  Postop day 4  Tolerating clear liquids, had bowel movement yesterday and is passing gas  States no pain unless doing movements  Is refusing therapy, states she will not go to any skilled nursing facility no matter what the recommendation is and will go home, states she does have help at home, states at baseline she does not walk more than 10 to 15 feet, has electric chair and cane and walker as well as a lift chair.      OBJECTIVE  CURRENT VITALS BP (!) 119/47   Pulse 73   Temp 98.4 °F (36.9 °C) (Temporal)   Resp 16   Ht 1.575 m (5' 2\")   Wt 123.5 kg (272 lb 4.8 oz)   SpO2 99%   BMI 49.80 kg/m²   GENERAL: Awake and alert, oriented x 4, cooperative, no distress  LUNGS: Speaking in complete sentences, no increased work of breathing, comfortable  HEART: Normal rate, regular rhythm, and intact distal pulses  ABDOMEN: Soft, nondistended, tenderness near the incisions as expected, no dehiscence, staples on the midline incision and left trocar sites, minimal drainage on the dressings overlying  EXTERMITY: Has swelling of the upper extremities with multiple bruises where prior needlesticks were done., no specific area of erythema  24 HR INTAKE/OUTPUT :   Intake/Output Summary (Last 24 hours) at 4/7/2025 0848  Last data filed at 4/6/2025 2331  Gross per 24 hour   Intake 2617.24 ml   Output 80 ml   Net 2537.24 ml     DRAIN/TUBE OUTPUT : [REMOVED] NG/OG/NJ/NE Tube Nasogastric 18 fr Right nostril-Output (mL): 50 ml  [REMOVED] NG/OG/NJ/NE Tube Nasogastric 18 fr Right nostril-Output (mL): 30 ml    Current Facility-Administered Medications   Medication Dose Route Frequency Provider Last Rate Last Admin    insulin lispro (HUMALOG,ADMELOG) injection vial 0-16 Units  0-16 Units SubCUTAneous 4x Daily AC & HS Mickey Garsia MD        acetaminophen (TYLENOL)  Partial Purse String (Simple) Text: Given the location of the defect and the characteristics of the surrounding skin a simple purse string closure was deemed most appropriate.  Undermining was performed circumfirentially around the surgical defect.  A purse string suture was then placed and tightened. Wound tension only allowed a partial closure of the circular defect.

## 2025-04-07 NOTE — PROGRESS NOTES
Home Oxygen Evaluation    Home Oxygen evaluation Results completed.    Room air SpO2    At rest 92%  With exercise/exertion 88%      SpO2 on prescribed O2 level at 2 LPM    At rest 96%  With exercise/exertion 92%    Nocturnal Oximetry with patient on room air recommended if the resting SpO2 is 89% to 95%. (Requires additional order)    Halima Nolan RCP   11:57 AM

## 2025-04-07 NOTE — PROGRESS NOTES
Hospitalist Progress Note  4/7/2025 9:26 AM  Subjective:   Admit Date: 4/3/2025  PCP: Lynette Han MD    Interval History: Patient is s/p Lysis of adhesion post op day #4.Tolerating CLD no nausea or emesis.Denies chest pain or shortness of breath.Remains on supplemental oxygen at 2 litres.    Diet: ADULT DIET; Regular  Medications:   Scheduled Meds:   insulin lispro  0-16 Units SubCUTAneous 4x Daily AC & HS    miconazole   Topical BID    ammonium lactate   Topical Daily    insulin glargine  20 Units SubCUTAneous Daily    apixaban  5 mg Oral BID    sodium chloride flush  10 mL IntraVENous 2 times per day    ipratropium 0.5 mg-albuterol 2.5 mg  1 Dose Inhalation Q4H WA RT    isosorbide mononitrate  60 mg Oral Daily    metoprolol tartrate  25 mg Oral BID    sodium chloride flush  5-40 mL IntraVENous 2 times per day     Continuous Infusions:   sodium chloride      dextrose      sodium chloride      sodium chloride 100 mL/hr at 04/07/25 0700     CBC:   Recent Labs     04/05/25  0536 04/06/25  0547 04/07/25  0524   WBC 10.9 9.3 7.4   HGB 9.5* 8.9* 8.9*    180 195     BMP:    Recent Labs     04/05/25  0536 04/06/25  0547 04/07/25  0524    143 140   K 3.8 3.9 4.7   * 110* 109*   CO2 25 24 25   BUN 31* 24* 20   CREATININE 0.8 0.7 0.7   GLUCOSE 113* 100* 100*     Hepatic: No results for input(s): \"AST\", \"ALT\", \"BILITOT\", \"ALKPHOS\" in the last 72 hours.    Invalid input(s): \"ALB\"  Troponin: No results for input(s): \"TROPONINI\" in the last 72 hours.  BNP: No results for input(s): \"BNP\" in the last 72 hours.  Lipids: No results for input(s): \"CHOL\", \"HDL\" in the last 72 hours.    Invalid input(s): \"LDLCALCU\"  INR: No results for input(s): \"INR\" in the last 72 hours.    Objective:   Vitals: BP (!) 119/47   Pulse 73   Temp 98.4 °F (36.9 °C) (Temporal)   Resp 16   Ht 1.575 m (5' 2\")   Wt 123.5 kg (272 lb 4.8 oz)   SpO2 99%   BMI 49.80 kg/m²   General appearance: alert and cooperative with  exam  HEENT: Eye: Normal external eye, conjunctiva, lids cornea, DUONG.  Neck: no adenopathy, no carotid bruit, no JVD, supple, symmetrical, trachea midline, and thyroid not enlarged, symmetric, no tenderness/mass/nodules  Lungs: clear to auscultation bilaterally  Heart: regular rate and rhythm, S1, S2 normal, no murmur, click, rub or gallop  Abdomen: Soft,bowel sounds positive ,wound sites ok  Extremities: extremities normal, atraumatic, no cyanosis or edema  Neurologic: Mental status: Alert, oriented, thought content appropriate    Assessment and Plan:   S/p Lysis of adhesions POR#4  Hypoxia  DM tyPe 2  HTN  Plan;Advance diet per surgery.cut back on IV fluids.Try to wean of oxygen as possible if not home on oxygen.Patient would benefit from ECF but declines.   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

## 2025-04-07 NOTE — DISCHARGE INSTRUCTIONS
Follow up with Dr. Han in 1 week.    Please call to schedule this appointment [phone]       Take norco for pain as needed  No driving   No lifting more than 10 pounds for 6 weeks  May shower, no tub bath until seen in clinic  Normal diet  Dry dressings over staples and redress as needed  Follow up in 10-14 days with Yon Ramos PA-C

## 2025-04-08 ENCOUNTER — APPOINTMENT (OUTPATIENT)
Dept: GENERAL RADIOLOGY | Age: 85
DRG: 330 | End: 2025-04-08
Attending: INTERNAL MEDICINE
Payer: MEDICARE

## 2025-04-08 VITALS
HEIGHT: 62 IN | TEMPERATURE: 97.8 F | SYSTOLIC BLOOD PRESSURE: 106 MMHG | WEIGHT: 274.6 LBS | OXYGEN SATURATION: 93 % | RESPIRATION RATE: 16 BRPM | BODY MASS INDEX: 50.53 KG/M2 | DIASTOLIC BLOOD PRESSURE: 41 MMHG | HEART RATE: 75 BPM

## 2025-04-08 LAB
ANION GAP SERPL CALCULATED.3IONS-SCNC: 7 MMOL/L (ref 9–16)
BASOPHILS # BLD: 0.06 K/UL (ref 0–0.2)
BASOPHILS NFR BLD: 1 % (ref 0–2)
BUN SERPL-MCNC: 19 MG/DL (ref 8–23)
BUN/CREAT SERPL: 24 (ref 9–20)
CALCIUM SERPL-MCNC: 8.9 MG/DL (ref 8.6–10.4)
CHLORIDE SERPL-SCNC: 109 MMOL/L (ref 98–107)
CO2 SERPL-SCNC: 24 MMOL/L (ref 20–31)
CREAT SERPL-MCNC: 0.8 MG/DL (ref 0.6–0.9)
EOSINOPHIL # BLD: 0.2 K/UL (ref 0–0.44)
EOSINOPHILS RELATIVE PERCENT: 3 % (ref 1–4)
ERYTHROCYTE [DISTWIDTH] IN BLOOD BY AUTOMATED COUNT: 14.2 % (ref 11.8–14.4)
GFR, ESTIMATED: 72 ML/MIN/1.73M2
GLUCOSE BLD-MCNC: 158 MG/DL (ref 65–105)
GLUCOSE BLD-MCNC: 171 MG/DL (ref 65–105)
GLUCOSE SERPL-MCNC: 123 MG/DL (ref 74–99)
HCT VFR BLD AUTO: 30.7 % (ref 36.3–47.1)
HGB BLD-MCNC: 9.6 G/DL (ref 11.9–15.1)
IMM GRANULOCYTES # BLD AUTO: 0.17 K/UL (ref 0–0.3)
IMM GRANULOCYTES NFR BLD: 2 %
LYMPHOCYTES NFR BLD: 2.53 K/UL (ref 1.1–3.7)
LYMPHOCYTES RELATIVE PERCENT: 34 % (ref 24–43)
MCH RBC QN AUTO: 29.5 PG (ref 25.2–33.5)
MCHC RBC AUTO-ENTMCNC: 31.3 G/DL (ref 25.2–33.5)
MCV RBC AUTO: 94.5 FL (ref 82.6–102.9)
MONOCYTES NFR BLD: 0.87 K/UL (ref 0.1–1.2)
MONOCYTES NFR BLD: 12 % (ref 3–12)
NEUTROPHILS NFR BLD: 48 % (ref 36–65)
NEUTS SEG NFR BLD: 3.69 K/UL (ref 1.5–8.1)
NRBC BLD-RTO: 0 PER 100 WBC
PLATELET # BLD AUTO: 229 K/UL (ref 138–453)
PMV BLD AUTO: 9.7 FL (ref 8.1–13.5)
POTASSIUM SERPL-SCNC: 4.7 MMOL/L (ref 3.7–5.3)
RBC # BLD AUTO: 3.25 M/UL (ref 3.95–5.11)
SODIUM SERPL-SCNC: 140 MMOL/L (ref 136–145)
WBC OTHER # BLD: 7.5 K/UL (ref 3.5–11.3)

## 2025-04-08 PROCEDURE — 6370000000 HC RX 637 (ALT 250 FOR IP): Performed by: SURGERY

## 2025-04-08 PROCEDURE — 36415 COLL VENOUS BLD VENIPUNCTURE: CPT

## 2025-04-08 PROCEDURE — 99024 POSTOP FOLLOW-UP VISIT: CPT | Performed by: PHYSICIAN ASSISTANT

## 2025-04-08 PROCEDURE — 6360000002 HC RX W HCPCS: Performed by: INTERNAL MEDICINE

## 2025-04-08 PROCEDURE — 80048 BASIC METABOLIC PNL TOTAL CA: CPT

## 2025-04-08 PROCEDURE — 94640 AIRWAY INHALATION TREATMENT: CPT

## 2025-04-08 PROCEDURE — 82947 ASSAY GLUCOSE BLOOD QUANT: CPT

## 2025-04-08 PROCEDURE — 6370000000 HC RX 637 (ALT 250 FOR IP): Performed by: INTERNAL MEDICINE

## 2025-04-08 PROCEDURE — 99238 HOSP IP/OBS DSCHRG MGMT 30/<: CPT | Performed by: INTERNAL MEDICINE

## 2025-04-08 PROCEDURE — 71045 X-RAY EXAM CHEST 1 VIEW: CPT

## 2025-04-08 PROCEDURE — 2580000003 HC RX 258: Performed by: FAMILY MEDICINE

## 2025-04-08 PROCEDURE — 85025 COMPLETE CBC W/AUTO DIFF WBC: CPT

## 2025-04-08 PROCEDURE — 94669 MECHANICAL CHEST WALL OSCILL: CPT

## 2025-04-08 PROCEDURE — 6370000000 HC RX 637 (ALT 250 FOR IP)

## 2025-04-08 RX ORDER — METOPROLOL TARTRATE 25 MG/1
25 TABLET, FILM COATED ORAL 2 TIMES DAILY
Qty: 60 TABLET | Refills: 0 | Status: SHIPPED | OUTPATIENT
Start: 2025-04-08 | End: 2025-05-08

## 2025-04-08 RX ORDER — FUROSEMIDE 10 MG/ML
20 INJECTION INTRAMUSCULAR; INTRAVENOUS ONCE
Status: COMPLETED | OUTPATIENT
Start: 2025-04-08 | End: 2025-04-08

## 2025-04-08 RX ORDER — HYDROCODONE BITARTRATE AND ACETAMINOPHEN 5; 325 MG/1; MG/1
1 TABLET ORAL EVERY 6 HOURS PRN
Qty: 12 TABLET | Refills: 0 | Status: SHIPPED | OUTPATIENT
Start: 2025-04-08 | End: 2025-04-11

## 2025-04-08 RX ORDER — AMMONIUM LACTATE 12 G/100G
LOTION TOPICAL
Qty: 1 EACH | Refills: 0
Start: 2025-04-09

## 2025-04-08 RX ADMIN — SODIUM CHLORIDE: 0.9 INJECTION, SOLUTION INTRAVENOUS at 01:00

## 2025-04-08 RX ADMIN — Medication: at 08:02

## 2025-04-08 RX ADMIN — MICONAZOLE NITRATE: 20 POWDER TOPICAL at 08:02

## 2025-04-08 RX ADMIN — FUROSEMIDE 20 MG: 10 INJECTION, SOLUTION INTRAMUSCULAR; INTRAVENOUS at 10:07

## 2025-04-08 RX ADMIN — METOPROLOL TARTRATE 25 MG: 25 TABLET, FILM COATED ORAL at 08:17

## 2025-04-08 RX ADMIN — ISOSORBIDE MONONITRATE 60 MG: 60 TABLET, EXTENDED RELEASE ORAL at 08:17

## 2025-04-08 RX ADMIN — INSULIN GLARGINE 20 UNITS: 100 INJECTION, SOLUTION SUBCUTANEOUS at 08:18

## 2025-04-08 RX ADMIN — APIXABAN 5 MG: 5 TABLET, FILM COATED ORAL at 08:15

## 2025-04-08 RX ADMIN — IPRATROPIUM BROMIDE AND ALBUTEROL SULFATE 1 DOSE: .5; 3 SOLUTION RESPIRATORY (INHALATION) at 11:22

## 2025-04-08 NOTE — PROGRESS NOTES
Occupational Therapy    Patient met with refusal from OT and was adamant on not wanting inpatient OT services at this time. States she is content with her current level of function and does not need OT anymore. Patient did not want to hear the benefits of OT and continued to refuse services. Nursing made aware.    NUNU Rousseau

## 2025-04-08 NOTE — PROGRESS NOTES
Hospitalist Progress Note    Patient:  Urbano Levy     YOB: 1940    MRN: [id number]   Admit date: 4/3/2025     Acct: [id number]     PCP: Lynette Han MD    CC--Interval History: POD 5 LRA to open laparotomy---[facility]---4.3.2025---[facility]----home with [facility]---4.8.2025    Closed loop SBO    ASCVD---stable--cont'd Lasix    HTN---117/52    DM2--BG = 123 [goal 140-180]    CKD---Stage 3a --> Stage 2    See note below    All other ROS negative except noted in HPI    Diet:  ADULT DIET; Regular    Medications:  Scheduled Meds:   insulin lispro  0-16 Units SubCUTAneous 4x Daily AC & HS    miconazole   Topical BID    ammonium lactate   Topical Daily    insulin glargine  20 Units SubCUTAneous Daily    apixaban  5 mg Oral BID    sodium chloride flush  10 mL IntraVENous 2 times per day    ipratropium 0.5 mg-albuterol 2.5 mg  1 Dose Inhalation Q4H WA RT    isosorbide mononitrate  60 mg Oral Daily    metoprolol tartrate  25 mg Oral BID    sodium chloride flush  5-40 mL IntraVENous 2 times per day     Continuous Infusions:   sodium chloride      dextrose      sodium chloride       PRN Meds:acetaminophen, phenol, oxyCODONE-acetaminophen **OR** oxyCODONE-acetaminophen, sodium chloride flush, sodium chloride, ondansetron, polyethylene glycol, glucose, dextrose bolus **OR** dextrose bolus, glucagon (rDNA), dextrose, ipratropium 0.5 mg-albuterol 2.5 mg, morphine **OR** morphine, sodium chloride flush, sodium chloride    Objective:  Labs:  CBC with Differential:    Lab Results   Component Value Date/Time    WBC 7.5 04/08/2025 05:16 AM    RBC 3.25 04/08/2025 05:16 AM    HGB 9.6 04/08/2025 05:16 AM    HCT 30.7 04/08/2025 05:16 AM     04/08/2025 05:16 AM    MCV 94.5 04/08/2025 05:16 AM    MCH 29.5 04/08/2025 05:16 AM    MCHC 31.3 04/08/2025 05:16 AM    RDW 14.2 04/08/2025 05:16 AM    LYMPHOPCT 34 04/08/2025 05:16 AM    MONOPCT 12 04/08/2025 05:16 AM    EOSPCT 3 04/08/2025 05:16 AM    BASOPCT 1 04/08/2025  lactic acid level----4.3.2025 = 2.2  Abdominal pain--4.3.2025--due to SBO  Hemocult--[+]  Skin folds--yeast  Anemia--expected acute blood loss--surgery            CT AP---4.3.2025---fluid--air--clustered small bowel loops--mesenteric                       edema--consistent with closed-loop obstruction    ASCVD          EKG----4.3.2025---SR--71--1st degree AVB         Cardiac catheterization--9.29.2022---stents Ramus  and RCA         Cardiac catheterization---8.29.2016--stent--location?         Total number of stents---9      HTN  Hyperlipidemia   Diabetes Mellitus Type 2  CKD--Stage 3a  Gait-balance instability  Morbid obesity  HEARING IMPAIRMENT    PMH:   OA  PSH:    see above, right femur fracture, cholecystectomy, T&A, hysterectomy---cervix                unknown, colonoscopy, ulnar nerve repair, appendectomy, right MELISSA    Allergies:          Brilinta--ticagrelor--dyspnea, trichiasis lower lid--OS, posterior vitreous detachment   Intolerances:    Indomethacin--flushing,  ACTOS--pioglitazone--flushing, food--cramping, “statins,”                             Jardiance--empagliflozin--yeast infections      Plan:     Home---4.8.2025     Medications reviewed     Pain control per ---[facility]     Follow up Dr. Han,      Follow up Dr. Crenshaw,      See orders    Electronically signed by Munir Nagy MD on 4/8/2025 at 1:14 PM    Hospitalist

## 2025-04-08 NOTE — PROGRESS NOTES
General Surgery  Daily Progress Note  Pt Name: Urbano Levy  Medical Record Number: [id number]  Date of Birth 1940   Today's Date: 4/8/2025      SUBJECTIVE  Postop day 5  Tolerating regular, had bowel movement yesterday and today  States no pain unless doing movements  Again today Is refusing therapy, states she will not go to any skilled nursing facility no matter what the recommendation is and will go home, states she does have help at home, states at baseline she does not walk more than 10 to 15 feet, has electric chair and cane and walker as well as a lift chair.      OBJECTIVE  CURRENT VITALS BP (!) 117/52   Pulse 71   Temp 98.2 °F (36.8 °C) (Temporal)   Resp 16   Ht 1.575 m (5' 2\")   Wt 124.6 kg (274 lb 9.6 oz)   SpO2 90%   BMI 50.22 kg/m²   GENERAL: Awake and alert, oriented x 4, cooperative, no distress  LUNGS: Speaking in complete sentences, no increased work of breathing, comfortable  HEART: Normal rate, regular rhythm, and intact distal pulses  ABDOMEN: Soft, nondistended, tenderness near the incisions as expected, no dehiscence, staples on the midline incision and trocar sites, minimal drainage on the dressings overlying  EXTERMITY: Has swelling of the upper extremities with multiple bruises where prior needlesticks were done., no specific area of erythema  24 HR INTAKE/OUTPUT :   Intake/Output Summary (Last 24 hours) at 4/8/2025 0925  Last data filed at 4/8/2025 0522  Gross per 24 hour   Intake 2081.44 ml   Output --   Net 2081.44 ml     DRAIN/TUBE OUTPUT : [REMOVED] NG/OG/NJ/NE Tube Nasogastric 18 fr Right nostril-Output (mL): 50 ml  [REMOVED] NG/OG/NJ/NE Tube Nasogastric 18 fr Right nostril-Output (mL): 30 ml    Current Facility-Administered Medications   Medication Dose Route Frequency Provider Last Rate Last Admin    insulin lispro (HUMALOG,ADMELOG) injection vial 0-16 Units  0-16 Units SubCUTAneous 4x Daily AC & HS Mickey Garsia MD   4 Units at 04/07/25 2204    acetaminophen

## 2025-04-08 NOTE — PLAN OF CARE
Problem: Chronic Conditions and Co-morbidities  Goal: Patient's chronic conditions and co-morbidity symptoms are monitored and maintained or improved  Outcome: Progressing  Flowsheets (Taken 4/7/2025 2100)  Care Plan - Patient's Chronic Conditions and Co-Morbidity Symptoms are Monitored and Maintained or Improved:   Monitor and assess patient's chronic conditions and comorbid symptoms for stability, deterioration, or improvement   Collaborate with multidisciplinary team to address chronic and comorbid conditions and prevent exacerbation or deterioration   Update acute care plan with appropriate goals if chronic or comorbid symptoms are exacerbated and prevent overall improvement and discharge     Problem: Discharge Planning  Goal: Discharge to home or other facility with appropriate resources  Outcome: Progressing  Flowsheets (Taken 4/7/2025 2100)  Discharge to home or other facility with appropriate resources:   Identify barriers to discharge with patient and caregiver   Refer to discharge planning if patient needs post-hospital services based on physician order or complex needs related to functional status, cognitive ability or social support system     Problem: Safety - Adult  Goal: Free from fall injury  Outcome: Progressing     Problem: ABCDS Injury Assessment  Goal: Absence of physical injury  Outcome: Progressing     Problem: Skin/Tissue Integrity  Goal: Skin integrity remains intact  Description: 1.  Monitor for areas of redness and/or skin breakdown  2.  Assess vascular access sites hourly  3.  Every 4-6 hours minimum:  Change oxygen saturation probe site  4.  Every 4-6 hours:  If on nasal continuous positive airway pressure, respiratory therapy assess nares and determine need for appliance change or resting period  Outcome: Progressing  Flowsheets (Taken 4/7/2025 2100)  Skin Integrity Remains Intact:   Monitor for areas of redness and/or skin breakdown   Pressure redistribution bed/mattress (bed type)    with appropriate food choices     Problem: Genitourinary - Adult  Goal: Absence of urinary retention  Outcome: Progressing

## 2025-04-08 NOTE — PROGRESS NOTES
25 1049   Encounter Summary   Encounter Overview/Reason Spiritual/Emotional Needs   Service Provided For Patient   Referral/Consult From Patient   Support System Children   Last Encounter  25   Complexity of Encounter Moderate   Begin Time 1002   End Time  1050   Total Time Calculated 48 min   Spiritual/Emotional needs   Type Spiritual Support   Assessment/Intervention/Outcome   Assessment Calm;Coping   Intervention Active listening;Discussed belief system/Church practices/lidia;Discussed illness injury and it’s impact;Discussed relationship with God;Explored/Affirmed feelings, thoughts, concerns;Prayer (assurance of)/[place]   Outcome Acceptance;Engaged in conversation;Expressed feelings, needs, and concerns;Expressed Gratitude     Spiritual Health History and Assessment/Progress Note  [facility]    (P) Spiritual/Emotional Needs,  ,  ,      Name: Urbano Levy MRN: [id number]    Age: 84 y.o.     Sex: female   Language: English   Scientology: No Adventism on file   SBO (small bowel obstruction) ([facility])     Date: 2025            Total Time Calculated: (P) 48 min              Spiritual Assessment began in [facility]        Referral/Consult From: (P) Patient   Encounter Overview/Reason: (P) Spiritual/Emotional Needs  Service Provided For: (P) Patient     rounding on PCU    Patient had requested spiritual consult but had been sleeping when  attempted to visit yesterday. When this  visited she acknowledged her request but that she din not have a specific need. She is not connected to a Scientology . Her background is Islam. She spent most of her life in [place] and moved here to be near her son after her   three years ago. She lives alone in an adjacent home on the same property. She does have some home care. Patient presents as being very independent. 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    Lidia, Belief, Meaning:   Patient identifies as

## 2025-04-08 NOTE — DISCHARGE INSTR - DIET

## 2025-04-09 ENCOUNTER — FOLLOWUP TELEPHONE ENCOUNTER (OUTPATIENT)
Dept: INPATIENT UNIT | Age: 85
End: 2025-04-09

## 2025-04-09 NOTE — PROGRESS NOTES
CLINICAL PHARMACY NOTE: MEDS TO BEDS    Total # of Prescriptions Filled: 1   The following medications were delivered to the patient:  Hydrocodone/apap 5/325mg    Additional Documentation:    Left LE

## 2025-04-09 NOTE — DISCHARGE SUMMARY
[facility]                [address] [place] [address]                            DISCHARGE SUMMARY      PATIENT NAME: SAUL LOVING               : 1940  MED REC NO: [id number]                         ROOM: 0204  ACCOUNT NO: [id number]                       ADMIT DATE: 2025  PROVIDER: Munir Nagy MD      REFERRING PHYSICIAN:  KIRA SHARPE    PERSONAL PHYSICIAN:  Lynette Han MD, Internal Medicine, [place], [place].  The patient is seen by Izaiah Crenshaw DO, General Surgery, this hospitalization.    DIAGNOSES:    1. Small-bowel obstruction due to extensive adhesions 2025, with leukocytosis.  2. Status post [facility] open laparotomy, lysis of extensive adhesions 2025, Dr. Crenshaw.  3. Initial elevation of lactic acid level 2025 resolved.    4. Abdominal pain due to small bowel obstruction, 2025.  5. Hemoccult positive.  6. Skin fold yeast.  7. Anemia, expected acute blood loss.  8. Surgery, CT abdomen and pelvis, 2025, fluid air clusters, small bowel loops, mesenteric edema consistent with a closed loop obstruction.  9. Coronary artery disease.  EKG 2025, sinus rhythm, rate 71, first-degree AV block.  Cardiac catheterization 2022, stents, ramus and RCA.  Cardiac catheterization 2016.  Stent location unspecified.  The patient has a total of 9 stents.  10. Hypertension.  11. Hyperlipidemia.  12. Diabetes mellitus type 2.  13. Chronic kidney disease, stage IIIA at admission, now stage II at discharge.  14. Gait balance instability.  15. Morbid obesity.  16. Hearing impairment.  17. Other medical problems set forth in the progress note of 2025, incorporated for reference herein.    HISTORY OF PRESENT ILLNESS AND HOSPITAL COURSE:  The patient is an 84-year-old white female, patient of Dr. Lynette Han, Internal Medicine, [place], [place].       The patient presented initially to the emergency  OTC, 1 morning and at bedtime.  8. Metformin, Glucophage re-initiated 500 mg morning, noon, bedtime.  9. Metoprolol tartrate, Lopressor, 25 mg p.o. b.i.d.  10. Nystatin (Mycostatin) 1 g 3 times a day p.r.n. topically to affected areas.  11. Effient (prasugrel) 5 mg p.o. daily.  12. Pyridoxine 100 mg, B6 p.o. daily.  13. Vitamin C OTC 1 p.o. daily.  14. Vitamin D 2000 units p.o. daily.  Vitamin E 400 units p.o. daily.  15. Specifically discontinued was B12 sublingual, Tylenol PM, diphenhydramine, acetaminophen, Zetia (ezetimibe), Mounjaro (tirzepatide), Nitrostat.  16. Spironolactone (Aldactone) and Victoza (liraglutide).    FOLLOWUP:  With the patient's personal physician, Lynette Han, Internal Medicine, [place], [place].  Follow up with Dr. Crenshaw, General Surgery.    Any aspect of the patient's care not discussed in the chart and/or dictation will be addressed and treated as an outpatient.    The patient's medications have been reviewed including, but not limited to, pre-hospital, hospital and discharge medications.  The patient and/or the patient's personal representatives were specifically advised the only medications to be taken are those set forth in the discharge orders and no other medications should be taken.  Any prior medications not on the discharge orders are specifically discontinued.          BOOKER SCHNEIDER MD      D:  04/09/2025 08:24:27     T:  04/09/2025 08:55:11     STEWART/MANDO  Job #:  [id number]     Doc#:  [id number]    CC:   [facility]

## 2025-04-16 ENCOUNTER — OFFICE VISIT (OUTPATIENT)
Dept: SURGERY | Age: 85
End: 2025-04-16

## 2025-04-16 VITALS
HEART RATE: 63 BPM | SYSTOLIC BLOOD PRESSURE: 102 MMHG | OXYGEN SATURATION: 96 % | HEIGHT: 62 IN | DIASTOLIC BLOOD PRESSURE: 62 MMHG | TEMPERATURE: 96.5 F | BODY MASS INDEX: 46.38 KG/M2 | WEIGHT: 252 LBS

## 2025-04-16 DIAGNOSIS — Z48.89 POSTOPERATIVE VISIT: Primary | ICD-10-CM

## 2025-04-16 PROCEDURE — 99024 POSTOP FOLLOW-UP VISIT: CPT | Performed by: PHYSICIAN ASSISTANT

## 2025-04-16 RX ORDER — CEPHALEXIN 500 MG/1
500 CAPSULE ORAL 3 TIMES DAILY
Qty: 21 CAPSULE | Refills: 0 | Status: SHIPPED | OUTPATIENT
Start: 2025-04-16 | End: 2025-04-23

## 2025-04-16 RX ORDER — ISOSORBIDE MONONITRATE 60 MG/1
TABLET, EXTENDED RELEASE ORAL
COMMUNITY
Start: 2025-02-04

## 2025-04-16 RX ORDER — HYDROCODONE BITARTRATE AND ACETAMINOPHEN 5; 325 MG/1; MG/1
TABLET ORAL
COMMUNITY
Start: 2025-04-08

## 2025-04-16 RX ORDER — FUROSEMIDE 40 MG/1
TABLET ORAL
COMMUNITY
Start: 2025-02-04

## 2025-04-16 RX ORDER — MAGNESIUM 64 MG (MAGNESIUM CHLORIDE) TABLET,DELAYED RELEASE
1 2 TIMES DAILY
COMMUNITY
Start: 2025-02-06

## 2025-04-16 RX ORDER — SITAGLIPTIN 50 MG/1
50 TABLET, FILM COATED ORAL DAILY
COMMUNITY
Start: 2025-04-15 | End: 2026-04-15

## 2025-04-16 RX ORDER — DULOXETIN HYDROCHLORIDE 20 MG/1
CAPSULE, DELAYED RELEASE ORAL
COMMUNITY
Start: 2025-02-04

## 2025-04-16 NOTE — PROGRESS NOTES
Subjective   Urbano Levy is a 84 y.o. female who presents today for post operative visit after having lap robotic assisted exploration of abdomen and lysis of adhesions converted to open done on 4/3/25 by IESHA Crenshaw. Currently state pain is 5, is an achy sensation that mostly occurs when she lays back in her chair and stretches the upper abdomen, states it is not bad, and is using norco for pain if needed, Incision sites have no drainage  or swelling or tenderness around them. There is a redness aroudn the staples, Patient states is having regular bowel movements in that she has one everyday, does have diarrhea which she states is her normal d/t her metformin and diabetes medications, has loss of appetite, is still eating but not as much as prior, still sufficient amounts she feels.        Past Medical History:   Diagnosis Date    Arthritis     Chest pain 09/28/2022    Diabetes (HCC)     Early dry stage nonexudative age-related macular degeneration of both eyes 05/13/2019    Family history of melanoma 05/31/2017 2023- skin check up done per the patient  Last derm vipin- 2013  Mom- melanoma- at 70      Gait difficulty 05/16/2017    Glaucoma suspect of both eyes 05/13/2019    H/O colonoscopy 05/31/2017 2017- declined follow up  2007- negative per the patient      Hypertension     Posterior vitreous detachment of both eyes 12/13/2021    Primary osteoarthritis of both knees 09/28/2016 11/27/2024- right knee gel one  11/2024- left knee gel one  - she has scooter- due to bilat knee arthritis  - she also has bilat elbow canes  BILAT KNEE OA- GETS SHOTS FROM ORTHO- DR CALABRESE      S/P coronary artery stent placement 09/29/2022    Seborrheic dermatitis 08/23/2017    - rare use of mycolog cream for years as per the patient      Trichiasis of left lower eyelid 12/12/2022       Past Surgical History:   Procedure Laterality Date    COLONOSCOPY      FEMUR SURGERY Right     [place]    GALLBLADDER SURGERY      HYSTERECTOMY

## 2025-07-08 ENCOUNTER — TELEPHONE (OUTPATIENT)
Dept: WOUND CARE | Age: 85
End: 2025-07-08

## 2025-07-08 NOTE — TELEPHONE ENCOUNTER
Patient called stating the incision from her abdominal surgery on 4/03/25, is not healing. Stated it is open and oozing, but not bleeding. Patients primary doctor directed her to schedule with . Writer explained to patient she would need to be seen in woundcare. Please advise on where to schedule patient.

## 2025-07-10 ENCOUNTER — TELEPHONE (OUTPATIENT)
Dept: WOUND CARE | Age: 85
End: 2025-07-10

## 2025-07-10 NOTE — TELEPHONE ENCOUNTER
CUSTOMER HAS WOUND NEAR NAVEL WITH BROWN DRAINAGE.  WANTING APPT FOR WOUND CARE.  SM BOWEL OBST SURGERY IN [facility] (DR. WILSON).  SAW VIANEY OJEDA FOR POST OP.  RECEIVED CALL FROM AMAN AT DR. TORRES'S OFFICE STATING PATIENT LEFT A MESSAGE REQUESTING A REFERRAL FOR WOUND CARE  SHE DOESN'T THINK SHE  NEEDS ONE AND ASKED THAT WE CALL AND SCHEDULE HER. NOT SURE WHO TO SCHEDULE HER WITH. PLEASE ADVISE.

## 2025-07-22 ENCOUNTER — HOSPITAL ENCOUNTER (OUTPATIENT)
Dept: WOUND CARE | Age: 85
Discharge: HOME | End: 2025-07-23
Attending: PHYSICIAN ASSISTANT
Payer: MEDICARE

## 2025-07-22 VITALS
HEIGHT: 62 IN | BODY MASS INDEX: 46.09 KG/M2 | HEART RATE: 72 BPM | RESPIRATION RATE: 16 BRPM | DIASTOLIC BLOOD PRESSURE: 74 MMHG | TEMPERATURE: 97.2 F | SYSTOLIC BLOOD PRESSURE: 124 MMHG

## 2025-07-22 DIAGNOSIS — L89.301 PRESSURE INJURY OF BUTTOCK, STAGE 1, UNSPECIFIED LATERALITY: ICD-10-CM

## 2025-07-22 DIAGNOSIS — S31.109D OPEN WOUND OF ABDOMINAL WALL, SUBSEQUENT ENCOUNTER: Primary | ICD-10-CM

## 2025-07-22 PROBLEM — S31.109A OPEN WOUND OF ABDOMINAL WALL: Status: ACTIVE | Noted: 2025-07-22

## 2025-07-22 PROCEDURE — 11042 DBRDMT SUBQ TIS 1ST 20SQCM/<: CPT

## 2025-07-22 PROCEDURE — 11042 DBRDMT SUBQ TIS 1ST 20SQCM/<: CPT | Performed by: PHYSICIAN ASSISTANT

## 2025-07-22 RX ORDER — NEOMYCIN/BACITRACIN/POLYMYXINB 3.5-400-5K
OINTMENT (GRAM) TOPICAL PRN
OUTPATIENT
Start: 2025-07-22

## 2025-07-22 RX ORDER — BETAMETHASONE DIPROPIONATE 0.5 MG/G
CREAM TOPICAL PRN
OUTPATIENT
Start: 2025-07-22

## 2025-07-22 RX ORDER — SODIUM CHLOR/HYPOCHLOROUS ACID 0.033 %
SOLUTION, IRRIGATION IRRIGATION PRN
OUTPATIENT
Start: 2025-07-22

## 2025-07-22 RX ORDER — LIDOCAINE HYDROCHLORIDE 20 MG/ML
JELLY TOPICAL PRN
OUTPATIENT
Start: 2025-07-22

## 2025-07-22 RX ORDER — MUPIROCIN 2 %
OINTMENT (GRAM) TOPICAL PRN
OUTPATIENT
Start: 2025-07-22

## 2025-07-22 RX ORDER — CLOBETASOL PROPIONATE 0.5 MG/G
OINTMENT TOPICAL PRN
OUTPATIENT
Start: 2025-07-22

## 2025-07-22 RX ORDER — GENTAMICIN SULFATE 1 MG/G
OINTMENT TOPICAL PRN
OUTPATIENT
Start: 2025-07-22

## 2025-07-22 RX ORDER — GINSENG 100 MG
CAPSULE ORAL PRN
OUTPATIENT
Start: 2025-07-22

## 2025-07-22 RX ORDER — LIDOCAINE 50 MG/G
OINTMENT TOPICAL PRN
OUTPATIENT
Start: 2025-07-22

## 2025-07-22 RX ORDER — BACITRACIN ZINC AND POLYMYXIN B SULFATE 500; 1000 [USP'U]/G; [USP'U]/G
OINTMENT TOPICAL PRN
OUTPATIENT
Start: 2025-07-22

## 2025-07-22 RX ORDER — SILVER SULFADIAZINE 10 MG/G
CREAM TOPICAL PRN
OUTPATIENT
Start: 2025-07-22

## 2025-07-22 RX ORDER — TRIAMCINOLONE ACETONIDE 1 MG/G
OINTMENT TOPICAL PRN
OUTPATIENT
Start: 2025-07-22

## 2025-07-22 RX ORDER — LIDOCAINE 40 MG/G
CREAM TOPICAL PRN
OUTPATIENT
Start: 2025-07-22

## 2025-07-22 RX ORDER — LIDOCAINE HYDROCHLORIDE 40 MG/ML
SOLUTION TOPICAL PRN
OUTPATIENT
Start: 2025-07-22

## 2025-07-22 ASSESSMENT — PAIN SCALES - GENERAL: PAINLEVEL_OUTOF10: 0

## 2025-07-22 NOTE — ASSESSMENT & PLAN NOTE
Wound is very clean appearing, some sloth and fibrin in the base but is clean out easily, there is no surrounding erythema or signs of infection, will start with some iodoform packing followed by Band-Aid over top, daily changes, follow-up with me or nursing in 2 to 3 weeks.  The buttocks looks like a bilateral stage I decubitus wound, no actual breakdown of the skin or fat exposed, does have some remanence of barrier cream present over that now but it does look wet, I educated the patient on pressure relief and keeping the area clean and dry, we will put her on bilateral foam dressings for the buttocks with daily changes, again follow-up in 2 to 3 weeks.

## 2025-07-22 NOTE — PROGRESS NOTES
[facility]   Progress Note and Procedure Note      Urbano Levy  MEDICAL RECORD NUMBER:  [id number]  AGE: 84 y.o.   GENDER: female  : 1940  EPISODE DATE:  2025    Subjective:     Chief Complaint   Patient presents with    Wound Check     abdomen         HISTORY of PRESENT ILLNESS HPI     Urbano Levy is a 84 y.o. female who presents today for wound/ulcer evaluation.   History of Wound Context: 84-year-old female who underwent robotic laparoscopic exploratory surgery that was converted to open at the beginning of 2025, a few weeks later had a very small area of dehiscence of the anterior abdominal wound and now that wound is not healing, notes no fevers no pain no discharge no odor, has used antibiotic ointment and again cannot get it to heal. Otherwise it does not seem to bother her. Date of establishment 2025 with wound care. Patient also notes having irritation of her buttocks that she noticed a few weeks after being hospitalized in April, has been using barrier creams without much relief, is mostly sedentary, uses lift chair at home, unable to use a offloading cushion due to not being able to get up in and out of the chair     Origin of the Wound: wound dehiscence on the abdomen, pressure of the bilateral buttocks  Diabetic: Yes  Last A1C:   Hemoglobin A1C   Date Value Ref Range Status   2025 6.9 (H) 4.0 - 6.0 % Final      Non-smoker    Current Dressing:  Iodoform on the abdomen  Foam on the buttocks    Today's issues/concerns: Establishing care today, nonhealing wound on the abdomen and buttocks, notes no fevers no chills no worsening pain discharge or odor.    PAST MEDICAL HISTORY        Diagnosis Date    Arthritis     Chest pain 2022    Diabetes (HCC)     Early dry stage nonexudative age-related macular degeneration of both eyes 2019    Family history of melanoma 2017- skin check up done per the patient  Last derm vipin-

## 2025-08-13 ENCOUNTER — HOSPITAL ENCOUNTER (OUTPATIENT)
Dept: WOUND CARE | Age: 85
Discharge: HOME | End: 2025-08-14
Attending: PHYSICIAN ASSISTANT
Payer: MEDICARE

## 2025-08-13 DIAGNOSIS — S31.109D OPEN WOUND OF ABDOMINAL WALL, SUBSEQUENT ENCOUNTER: Primary | ICD-10-CM

## 2025-08-13 PROCEDURE — 99213 OFFICE O/P EST LOW 20 MIN: CPT

## 2025-08-13 RX ORDER — LIDOCAINE HYDROCHLORIDE 20 MG/ML
JELLY TOPICAL PRN
Status: CANCELLED | OUTPATIENT
Start: 2025-08-13

## 2025-08-13 RX ORDER — CLOBETASOL PROPIONATE 0.5 MG/G
OINTMENT TOPICAL PRN
Status: CANCELLED | OUTPATIENT
Start: 2025-08-13

## 2025-08-13 RX ORDER — MUPIROCIN 2 %
OINTMENT (GRAM) TOPICAL PRN
Status: CANCELLED | OUTPATIENT
Start: 2025-08-13

## 2025-08-13 RX ORDER — GENTAMICIN SULFATE 1 MG/G
OINTMENT TOPICAL PRN
Status: CANCELLED | OUTPATIENT
Start: 2025-08-13

## 2025-08-13 RX ORDER — SODIUM CHLOR/HYPOCHLOROUS ACID 0.033 %
SOLUTION, IRRIGATION IRRIGATION PRN
Status: CANCELLED | OUTPATIENT
Start: 2025-08-13

## 2025-08-13 RX ORDER — NEOMYCIN/BACITRACIN/POLYMYXINB 3.5-400-5K
OINTMENT (GRAM) TOPICAL PRN
Status: CANCELLED | OUTPATIENT
Start: 2025-08-13

## 2025-08-13 RX ORDER — LIDOCAINE 40 MG/G
CREAM TOPICAL PRN
Status: CANCELLED | OUTPATIENT
Start: 2025-08-13

## 2025-08-13 RX ORDER — LIDOCAINE HYDROCHLORIDE 40 MG/ML
SOLUTION TOPICAL PRN
Status: CANCELLED | OUTPATIENT
Start: 2025-08-13

## 2025-08-13 RX ORDER — TRIAMCINOLONE ACETONIDE 1 MG/G
OINTMENT TOPICAL PRN
Status: CANCELLED | OUTPATIENT
Start: 2025-08-13

## 2025-08-13 RX ORDER — GINSENG 100 MG
CAPSULE ORAL PRN
Status: CANCELLED | OUTPATIENT
Start: 2025-08-13

## 2025-08-13 RX ORDER — SILVER SULFADIAZINE 10 MG/G
CREAM TOPICAL PRN
Status: CANCELLED | OUTPATIENT
Start: 2025-08-13

## 2025-08-13 RX ORDER — BETAMETHASONE DIPROPIONATE 0.5 MG/G
CREAM TOPICAL PRN
Status: CANCELLED | OUTPATIENT
Start: 2025-08-13

## 2025-08-13 RX ORDER — LIDOCAINE 50 MG/G
OINTMENT TOPICAL PRN
Status: CANCELLED | OUTPATIENT
Start: 2025-08-13

## 2025-08-13 RX ORDER — BACITRACIN ZINC AND POLYMYXIN B SULFATE 500; 1000 [USP'U]/G; [USP'U]/G
OINTMENT TOPICAL PRN
Status: CANCELLED | OUTPATIENT
Start: 2025-08-13

## (undated) DEVICE — SEALER LAP L37CM MARYLAND JAW OPN NANO COAT MULTIFUNCTIONAL

## (undated) DEVICE — TOTAL TRAY, DB, 100% SILI FOLEY, 16FR 10: Brand: MEDLINE

## (undated) DEVICE — NEEDLE INSUF L120MM DIA2MM DISP FOR PNEUMOPERI ENDOPATH

## (undated) DEVICE — YANKAUER,POOLE TIP,STERILE,50/CS: Brand: MEDLINE

## (undated) DEVICE — Device

## (undated) DEVICE — GARMENT,MEDLINE,DVT,INT,CALF,MED, GEN2: Brand: MEDLINE

## (undated) DEVICE — SUCTION IRRIGATOR: Brand: ENDOWRIST

## (undated) DEVICE — INSUFFLATION TUBING SET, ENDOFLATOR 50: Brand: N.A.

## (undated) DEVICE — MDHZ GEN LAPAROSCOPY&ROBOT: Brand: MEDLINE INDUSTRIES, INC.

## (undated) DEVICE — DRAPE, SLUSH XL, 44X66, STERILE: Brand: MEDLINE

## (undated) DEVICE — 450 ML BOTTLE OF 0.05% CHLORHEXIDINE GLUCONATE IN 99.95% STERILE WATER FOR IRRIGATION, USP AND APPLICATOR.: Brand: IRRISEPT ANTIMICROBIAL WOUND LAVAGE

## (undated) DEVICE — SOLUTION IV 1000 ML 0.9 NACL INJ USP EXCEL PLAS CONTAINER

## (undated) DEVICE — TIP COVER ACCESSORY

## (undated) DEVICE — BLADELESS OBTURATOR: Brand: WECK VISTA

## (undated) DEVICE — SUTURE PERMAHAND SZ 3-0 L18IN NONABSORBABLE BLK L26MM SH C013D

## (undated) DEVICE — SEAL

## (undated) DEVICE — POSITIONER HD W8XH4XL8.5IN RASPBERRY FOAM SLT

## (undated) DEVICE — TUBING, SUCTION, 3/16" X 20', STRAIGHT: Brand: MEDLINE

## (undated) DEVICE — SCISSOR SURG CRV ENDOCUT TIP FOR LAP DISP

## (undated) DEVICE — ADHESIVE SKIN CLSR 0.7ML TOP DERMBND ADV

## (undated) DEVICE — SUTURE MONOCRYL SZ 4-0 L18IN ABSRB UD L19MM PS-2 3/8 CIR PRIM Y496G

## (undated) DEVICE — TUBING SET, SUCTION LAP, S-PILOT: Brand: N.A.

## (undated) DEVICE — TROCAR: Brand: KII SHIELDED BLADED ACCESS SYSTEM

## (undated) DEVICE — YANKAUER,BULB TIP,W/O VENT,RIGID,STERILE: Brand: MEDLINE

## (undated) DEVICE — COUNTER NDL 40 COUNT HLD 70 FOAM BLK ADH W/ MAG

## (undated) DEVICE — SMOKE EVACUATION FILTER, SET WITH TUBE: Brand: N.A.

## (undated) DEVICE — DEVICE TRCR 12X9X3IN WHT CLSR DISP OMNICLOSE

## (undated) DEVICE — ARM DRAPE

## (undated) DEVICE — GLOVE ORANGE PI 7   MSG9070

## (undated) DEVICE — SUTURE PDS II SZ 0 L60IN ABSRB VLT L65MM TP-1 1/2 CIR Z991G